# Patient Record
Sex: MALE | Race: WHITE | Employment: UNEMPLOYED | URBAN - NONMETROPOLITAN AREA
[De-identification: names, ages, dates, MRNs, and addresses within clinical notes are randomized per-mention and may not be internally consistent; named-entity substitution may affect disease eponyms.]

---

## 2018-03-14 ENCOUNTER — OFFICE VISIT (OUTPATIENT)
Dept: FAMILY MEDICINE CLINIC | Age: 38
End: 2018-03-14
Payer: COMMERCIAL

## 2018-03-14 VITALS
BODY MASS INDEX: 32.62 KG/M2 | DIASTOLIC BLOOD PRESSURE: 80 MMHG | HEIGHT: 71 IN | HEART RATE: 98 BPM | OXYGEN SATURATION: 98 % | TEMPERATURE: 97.8 F | WEIGHT: 233 LBS | SYSTOLIC BLOOD PRESSURE: 130 MMHG

## 2018-03-14 DIAGNOSIS — Z00.00 ANNUAL PHYSICAL EXAM: Primary | ICD-10-CM

## 2018-03-14 PROCEDURE — 99385 PREV VISIT NEW AGE 18-39: CPT | Performed by: NURSE PRACTITIONER

## 2018-03-14 RX ORDER — HYDROCODONE BITARTRATE AND ACETAMINOPHEN 10; 325 MG/1; MG/1
1 TABLET ORAL EVERY 6 HOURS PRN
COMMUNITY
End: 2019-01-15 | Stop reason: ALTCHOICE

## 2018-03-14 RX ORDER — IBUPROFEN 800 MG/1
800 TABLET ORAL EVERY 6 HOURS PRN
COMMUNITY
End: 2019-01-15 | Stop reason: ALTCHOICE

## 2018-03-14 RX ORDER — HYDROCODONE BITARTRATE AND ACETAMINOPHEN 10; 325 MG/1; MG/1
TABLET ORAL
Refills: 0 | COMMUNITY
Start: 2018-03-07 | End: 2018-03-14 | Stop reason: SDUPTHER

## 2018-03-14 ASSESSMENT — PATIENT HEALTH QUESTIONNAIRE - PHQ9
2. FEELING DOWN, DEPRESSED OR HOPELESS: 0
1. LITTLE INTEREST OR PLEASURE IN DOING THINGS: 0
SUM OF ALL RESPONSES TO PHQ9 QUESTIONS 1 & 2: 0
SUM OF ALL RESPONSES TO PHQ QUESTIONS 1-9: 0

## 2018-06-07 PROBLEM — M54.16 LUMBAR RADICULOPATHY: Status: ACTIVE | Noted: 2018-06-07

## 2018-06-07 PROBLEM — M51.26 HERNIATED LUMBAR INTERVERTEBRAL DISC: Status: ACTIVE | Noted: 2018-06-07

## 2019-01-15 ENCOUNTER — OFFICE VISIT (OUTPATIENT)
Dept: FAMILY MEDICINE CLINIC | Age: 39
End: 2019-01-15
Payer: COMMERCIAL

## 2019-01-15 VITALS
HEIGHT: 70 IN | HEART RATE: 97 BPM | WEIGHT: 215.2 LBS | BODY MASS INDEX: 30.81 KG/M2 | OXYGEN SATURATION: 97 % | TEMPERATURE: 99 F | DIASTOLIC BLOOD PRESSURE: 86 MMHG | SYSTOLIC BLOOD PRESSURE: 138 MMHG

## 2019-01-15 DIAGNOSIS — Z00.00 WELL ADULT EXAM: Primary | ICD-10-CM

## 2019-01-15 DIAGNOSIS — R22.1 NECK MASS: ICD-10-CM

## 2019-01-15 PROCEDURE — 99395 PREV VISIT EST AGE 18-39: CPT | Performed by: NURSE PRACTITIONER

## 2019-01-15 PROCEDURE — G8484 FLU IMMUNIZE NO ADMIN: HCPCS | Performed by: NURSE PRACTITIONER

## 2019-01-15 RX ORDER — CEPHALEXIN 500 MG/1
500 CAPSULE ORAL 2 TIMES DAILY
Qty: 20 CAPSULE | Refills: 0 | Status: SHIPPED | OUTPATIENT
Start: 2019-01-15 | End: 2019-01-25

## 2019-01-15 ASSESSMENT — ENCOUNTER SYMPTOMS
RESPIRATORY NEGATIVE: 1
GASTROINTESTINAL NEGATIVE: 1
EYES NEGATIVE: 1

## 2021-02-14 ENCOUNTER — APPOINTMENT (OUTPATIENT)
Dept: GENERAL RADIOLOGY | Age: 41
End: 2021-02-14
Payer: COMMERCIAL

## 2021-02-14 ENCOUNTER — HOSPITAL ENCOUNTER (EMERGENCY)
Age: 41
Discharge: HOME | End: 2021-02-14
Attending: EMERGENCY MEDICINE
Payer: COMMERCIAL

## 2021-02-14 VITALS
DIASTOLIC BLOOD PRESSURE: 106 MMHG | HEART RATE: 98 BPM | OXYGEN SATURATION: 98 % | SYSTOLIC BLOOD PRESSURE: 153 MMHG | RESPIRATION RATE: 17 BRPM | TEMPERATURE: 98.5 F

## 2021-02-14 DIAGNOSIS — S20.212A CONTUSION OF LEFT CHEST WALL, INITIAL ENCOUNTER: ICD-10-CM

## 2021-02-14 DIAGNOSIS — S22.41XA CLOSED FRACTURE OF MULTIPLE RIBS OF RIGHT SIDE, INITIAL ENCOUNTER: Primary | ICD-10-CM

## 2021-02-14 PROCEDURE — 99281 EMR DPT VST MAYX REQ PHY/QHP: CPT

## 2021-02-14 PROCEDURE — 96372 THER/PROPH/DIAG INJ SC/IM: CPT

## 2021-02-14 PROCEDURE — 6360000002 HC RX W HCPCS: Performed by: EMERGENCY MEDICINE

## 2021-02-14 PROCEDURE — 71101 X-RAY EXAM UNILAT RIBS/CHEST: CPT

## 2021-02-14 RX ORDER — KETOROLAC TROMETHAMINE 10 MG/1
10 TABLET, FILM COATED ORAL EVERY 6 HOURS PRN
Qty: 20 TABLET | Refills: 0 | Status: SHIPPED | OUTPATIENT
Start: 2021-02-14 | End: 2022-10-04

## 2021-02-14 RX ORDER — ORPHENADRINE CITRATE 30 MG/ML
60 INJECTION INTRAMUSCULAR; INTRAVENOUS ONCE
Status: COMPLETED | OUTPATIENT
Start: 2021-02-14 | End: 2021-02-14

## 2021-02-14 RX ORDER — OXYCODONE HYDROCHLORIDE AND ACETAMINOPHEN 5; 325 MG/1; MG/1
1 TABLET ORAL EVERY 6 HOURS PRN
Qty: 12 TABLET | Refills: 0 | Status: SHIPPED | OUTPATIENT
Start: 2021-02-14 | End: 2021-02-17

## 2021-02-14 RX ORDER — KETOROLAC TROMETHAMINE 30 MG/ML
60 INJECTION, SOLUTION INTRAMUSCULAR; INTRAVENOUS ONCE
Status: COMPLETED | OUTPATIENT
Start: 2021-02-14 | End: 2021-02-14

## 2021-02-14 RX ORDER — ORPHENADRINE CITRATE 100 MG/1
100 TABLET, EXTENDED RELEASE ORAL 2 TIMES DAILY
Qty: 20 TABLET | Refills: 0 | Status: SHIPPED | OUTPATIENT
Start: 2021-02-14 | End: 2021-02-24

## 2021-02-14 RX ADMIN — ORPHENADRINE CITRATE 60 MG: 30 INJECTION INTRAMUSCULAR; INTRAVENOUS at 05:44

## 2021-02-14 RX ADMIN — KETOROLAC TROMETHAMINE 60 MG: 30 INJECTION, SOLUTION INTRAMUSCULAR at 05:44

## 2021-02-14 ASSESSMENT — PAIN SCALES - GENERAL
PAINLEVEL_OUTOF10: 9
PAINLEVEL_OUTOF10: 9

## 2021-02-14 ASSESSMENT — ENCOUNTER SYMPTOMS
VOMITING: 0
ABDOMINAL PAIN: 0
SHORTNESS OF BREATH: 0
SORE THROAT: 0
BACK PAIN: 0
SINUS PRESSURE: 0
APNEA: 0
COLOR CHANGE: 0
CONSTIPATION: 0
PHOTOPHOBIA: 0
RHINORRHEA: 0
NAUSEA: 0
COUGH: 0
ABDOMINAL DISTENTION: 0
WHEEZING: 0
DIARRHEA: 0
EYE PAIN: 0

## 2021-02-14 ASSESSMENT — PAIN DESCRIPTION - FREQUENCY: FREQUENCY: INTERMITTENT

## 2021-02-14 ASSESSMENT — PAIN DESCRIPTION - ORIENTATION: ORIENTATION: LEFT;ANTERIOR

## 2021-02-14 ASSESSMENT — PAIN DESCRIPTION - ONSET: ONSET: SUDDEN

## 2021-02-14 ASSESSMENT — PAIN DESCRIPTION - LOCATION: LOCATION: CHEST

## 2021-02-14 ASSESSMENT — PAIN DESCRIPTION - PROGRESSION: CLINICAL_PROGRESSION: GRADUALLY WORSENING

## 2021-02-14 NOTE — ED PROVIDER NOTES
[address] [facility] [address] ED  eMERGENCY dEPARTMENT eNCOUnter      Pt Name: Margie Vital  MRN: [id number]  [place] 1980  Date of evaluation: 2/14/2021  Provider: Paloma Sharpe MD    [address]       Chief Complaint   Patient presents with    Rib Pain     left anterior rib pain after fall into wood on thursday. no bruising noted to site, pain on palpation         HISTORY OF PRESENT ILLNESS   (Location/Symptom, Timing/Onset,Context/Setting, Quality, Duration, Modifying Factors, Severity)  Note limiting factors. Margie Vital is a 45 y.o. male who presents to the emergency department with complaint of left anterior rib cage pain since Thursday. Patient was cutting woods when he lost his balance and fell striking left chest wall on wood. No loss of consciousness. No other injuries. Pain has gotten worse and making it difficult for him to take deep breaths. Pain is 9 in a scale of 1-10 and worse with deep breaths and movement. Pain is sharp and persistent. Pain does not radiate. Denies shortness of breath. Denies any other systemic symptoms. HPI    Nursing Notes were reviewed. REVIEW OF SYSTEMS    (2-9 systems for level 4, 10 or more for level 5)     Review of Systems   Constitutional: Negative. Negative for activity change, appetite change, chills, fatigue and fever. HENT: Negative for congestion, ear discharge, ear pain, hearing loss, rhinorrhea, sinus pressure and sore throat. Eyes: Negative for photophobia, pain and visual disturbance. Respiratory: Negative for apnea, cough, shortness of breath and wheezing. Cardiovascular: Positive for chest pain. Negative for palpitations and leg swelling. Gastrointestinal: Negative for abdominal distention, abdominal pain, constipation, diarrhea, nausea and vomiting. Endocrine: Negative for cold intolerance, heat intolerance and polyuria. Genitourinary: Negative for dysuria, flank pain, frequency and urgency.    Musculoskeletal: Positive for arthralgias and myalgias. Negative for back pain, gait problem and neck stiffness. Skin: Negative for color change, pallor and rash. Allergic/Immunologic: Negative for food allergies and immunocompromised state. Neurological: Negative for dizziness, tremors, syncope, weakness, light-headedness and headaches. Psychiatric/Behavioral: Negative for agitation, confusion and hallucinations. All other systems reviewed and are negative. Except as noted above the remainder of the review of systems was reviewed and negative.        PAST MEDICAL HISTORY     Past Medical History:   Diagnosis Date    Lumbar radiculopathy 6/7/2018         SURGICAL HISTORY       Past Surgical History:   Procedure Laterality Date    CYST REMOVAL  2012    neck/back of head    OTHER SURGICAL HISTORY  1/17/13    pilonidol cyst    OTHER SURGICAL HISTORY  3/4/13    Repair of scalp wound dehiscence    OTHER SURGICAL HISTORY  3/4/13    exploration of scalp wound with ligation of bleeder    WISDOM TOOTH EXTRACTION           CURRENT MEDICATIONS       Previous Medications    No medications on file       ALLERGIES     Vicodin [hydrocodone-acetaminophen] and Codeine    FAMILY HISTORY       Family History   Problem Relation Age of Onset    Diabetes Father     Cancer Neg Hx     Breast Cancer Neg Hx     Colon Cancer Neg Hx     Prostate Cancer Neg Hx     High Cholesterol Neg Hx     High Blood Pressure Neg Hx           SOCIAL HISTORY       Social History     Socioeconomic History    Marital status: Single     Spouse name: None    Number of children: None    Years of education: None    Highest education level: None   Occupational History    None   Social Needs    Financial resource strain: None    Food insecurity     Worry: None     Inability: None    Transportation needs     Medical: None     Non-medical: None   Tobacco Use    Smoking status: Current Every Day Smoker     Packs/day: 1.00     Years: 18.00     Pack years: 18.00  Smokeless tobacco: Never Used   Substance and Sexual Activity    Alcohol use: None    Drug use: None    Sexual activity: None   Lifestyle    Physical activity     Days per week: None     Minutes per session: None    Stress: None   Relationships    Social connections     Talks on phone: None     Gets together: None     Attends Orthodox service: None     Active member of club or organization: None     Attends meetings of clubs or organizations: None     Relationship status: None    Intimate partner violence     Fear of current or ex partner: None     Emotionally abused: None     Physically abused: None     Forced sexual activity: None   Other Topics Concern    None   Social History Narrative    None       SCREENINGS             PHYSICAL EXAM    (up to 7 for level 4, 8 or more for level 5)     ED Triage Vitals [02/14/21 0517]   BP Temp Temp Source Pulse Resp SpO2 Height Weight   (!) 153/106 98.5 °F (36.9 °C) Oral 98 17 98 % -- --       Physical Exam  Vitals signs and nursing note reviewed. Constitutional:       General: He is in acute distress. Appearance: Normal appearance. He is well-developed. He is obese. He is not ill-appearing, toxic-appearing or diaphoretic. HENT:      Head: Normocephalic and atraumatic. Nose: Nose normal. No congestion or rhinorrhea. Mouth/Throat:      Mouth: Mucous membranes are moist.      Pharynx: Oropharynx is clear. No oropharyngeal exudate or posterior oropharyngeal erythema. Eyes:      General: No scleral icterus. Right eye: No discharge. Left eye: No discharge. Extraocular Movements: Extraocular movements intact. Conjunctiva/sclera: Conjunctivae normal.      Pupils: Pupils are equal, round, and reactive to light. Neck:      Musculoskeletal: Normal range of motion and neck supple. No neck rigidity or muscular tenderness. Thyroid: No thyromegaly. Vascular: No carotid bruit or JVD. Trachea: No tracheal deviation. Cardiovascular:      Rate and Rhythm: Normal rate and regular rhythm. Heart sounds: Normal heart sounds. No murmur. No friction rub. No gallop. Pulmonary:      Effort: Pulmonary effort is normal. No respiratory distress. Breath sounds: Normal breath sounds. No stridor. No wheezing, rhonchi or rales. Chest:      Chest wall: No tenderness. Abdominal:      General: Bowel sounds are normal. There is no distension. Palpations: Abdomen is soft. There is no mass. Tenderness: There is no abdominal tenderness. There is no right CVA tenderness, left CVA tenderness, guarding or rebound. Hernia: No hernia is present. Musculoskeletal: Normal range of motion. General: Signs of injury present. No swelling, tenderness or deformity. Right lower leg: No edema. Left lower leg: No edema. Comments: Tenderness on left anterior upper rib cage. No ecchymosis noted. Lymphadenopathy:      Cervical: No cervical adenopathy. Skin:     General: Skin is warm and dry. Capillary Refill: Capillary refill takes less than 2 seconds. Coloration: Skin is not jaundiced or pale. Findings: No bruising, erythema, lesion or rash. Neurological:      General: No focal deficit present. Mental Status: He is alert and oriented to person, place, and time. Mental status is at baseline. Cranial Nerves: No cranial nerve deficit. Sensory: No sensory deficit. Motor: No weakness or abnormal muscle tone. Coordination: Coordination normal.      Gait: Gait normal.      Deep Tendon Reflexes: Reflexes are normal and symmetric. Reflexes normal.   Psychiatric:         Mood and Affect: Mood normal.         Behavior: Behavior normal.         Thought Content:  Thought content normal.         Judgment: Judgment normal.         DIAGNOSTIC RESULTS     EKG: All EKG's are interpreted by the Emergency Department Physician who either signs or Co-signs this chart in the absence of a cardiologist.        RADIOLOGY:   Non-plain film images such as CT, Ultrasound and MRI are read by the radiologist. Fleeta Ax radiographicimages are visualized and preliminarily interpreted by the emergency physician with the below findings:    X-ray rib series shows minimally displaced fracture of left 3rd rib, nondisplaced fracture of left fourth rib, no pneumothorax, no hemothorax. Interpretation per the Radiologist below, if available at the time of this note:    XR RIBS LEFT INCLUDE CHEST (MIN 3 VIEWS)    (Results Pending)         ED BEDSIDE ULTRASOUND:   Performed by ED Physician - none    LABS:  Labs Reviewed - No data to display    All other labs were within normal range or not returned as of this dictation. EMERGENCY DEPARTMENT COURSE and DIFFERENTIALDIAGNOSIS/MDM:    X-ray shows minimally displaced left third rib and nondisplaced left fourth rib with no associated pneumothorax or hemothorax. Adequate pain control was achieved with Toradol and Norflex IM. Patient was given incentive spirometer to be used at home as needed. He was counseled on deep breaths. He will be prescribed Toradol, Norflex, Percocet. He was advised to follow-up with family doctor within 3 days and to come back to the ED for new or worsening symptoms. He was agreeable with plan of care. All of his questions were addressed to his satisfaction.    Vitals:    Vitals:    02/14/21 0517   BP: (!) 153/106   Pulse: 98   Resp: 17   Temp: 98.5 °F (36.9 °C)   TempSrc: Oral   SpO2: 98%           MDM  Number of Diagnoses or Management Options     Amount and/or Complexity of Data Reviewed  Tests in the radiology section of CPT®: reviewed and ordered    Risk of Complications, Morbidity, and/or Mortality  Presenting problems: moderate  Diagnostic procedures: moderate  Management options: moderate    Patient Progress  Patient progress: improved      CRITICAL CARE TIME   Total Critical Care time was  minutes, excluding separately reportable procedures. There was a high probability of clinically significant/life threatening deterioration in the patient's condition which required my urgentintervention. CONSULTS:  None    PROCEDURES:  Unless otherwise noted below, none     Procedures    FINAL IMPRESSION      1. Closed fracture of multiple ribs of right side, initial encounter    2. Contusion of left chest wall, initial encounter          DISPOSITION/PLAN   DISPOSITION Decision To Discharge 02/14/2021 05:54:55 AM      PATIENT REFERRED TO:  СЕРГЕЙ Pugh - CNP  [address], [address]  [place] [phone]    In 3 days        DISCHARGE MEDICATIONS:  New Prescriptions    KETOROLAC (TORADOL) 10 MG TABLET    Take 1 tablet by mouth every 6 hours as needed for Pain    ORPHENADRINE (NORFLEX) 100 MG EXTENDED RELEASE TABLET    Take 1 tablet by mouth 2 times daily for 10 days    OXYCODONE-ACETAMINOPHEN (PERCOCET) 5-325 MG PER TABLET    Take 1 tablet by mouth every 6 hours as needed for Pain for up to 3 days. WARNING:  May cause drowsiness. May impair ability to operate vehicles or machinery. Do not use in combination with alcohol. Periodic Controlled Substance Monitoring: No signs of potential drug abuse or diversion identified.  Anastasia Jj MD)    (Please note that portions of this note were completed with a voice recognitionprogram.  Efforts were made to edit the dictations but occasionally words are mis-transcribed.)    Anastasia Jj MD (electronically signed)  Attending Emergency Physician          Anastasia Jj MD  02/14/21 3307

## 2022-01-22 ENCOUNTER — HOSPITAL ENCOUNTER (EMERGENCY)
Age: 42
Discharge: HOME | End: 2022-01-22
Attending: EMERGENCY MEDICINE
Payer: COMMERCIAL

## 2022-01-22 VITALS
HEART RATE: 108 BPM | TEMPERATURE: 97.4 F | SYSTOLIC BLOOD PRESSURE: 149 MMHG | BODY MASS INDEX: 31.5 KG/M2 | OXYGEN SATURATION: 97 % | RESPIRATION RATE: 16 BRPM | WEIGHT: 220 LBS | HEIGHT: 70 IN | DIASTOLIC BLOOD PRESSURE: 100 MMHG

## 2022-01-22 DIAGNOSIS — K02.9 PAIN DUE TO DENTAL CARIES: Primary | ICD-10-CM

## 2022-01-22 DIAGNOSIS — K04.7 DENTAL ABSCESS: ICD-10-CM

## 2022-01-22 PROCEDURE — 6370000000 HC RX 637 (ALT 250 FOR IP): Performed by: EMERGENCY MEDICINE

## 2022-01-22 PROCEDURE — 99283 EMERGENCY DEPT VISIT LOW MDM: CPT

## 2022-01-22 RX ORDER — ONDANSETRON 4 MG/1
4 TABLET, ORALLY DISINTEGRATING ORAL ONCE
Status: COMPLETED | OUTPATIENT
Start: 2022-01-22 | End: 2022-01-22

## 2022-01-22 RX ORDER — ONDANSETRON 4 MG/1
4 TABLET, ORALLY DISINTEGRATING ORAL 3 TIMES DAILY PRN
Qty: 21 TABLET | Refills: 0 | Status: SHIPPED | OUTPATIENT
Start: 2022-01-22 | End: 2022-10-04

## 2022-01-22 RX ORDER — BUPROPION HYDROCHLORIDE 75 MG/1
75 TABLET ORAL DAILY
Qty: 30 TABLET | Refills: 0 | Status: SHIPPED | OUTPATIENT
Start: 2022-01-22 | End: 2022-10-04

## 2022-01-22 RX ORDER — AMOXICILLIN AND CLAVULANATE POTASSIUM 875; 125 MG/1; MG/1
1 TABLET, FILM COATED ORAL ONCE
Status: COMPLETED | OUTPATIENT
Start: 2022-01-22 | End: 2022-01-22

## 2022-01-22 RX ORDER — AMOXICILLIN AND CLAVULANATE POTASSIUM 875; 125 MG/1; MG/1
1 TABLET, FILM COATED ORAL 2 TIMES DAILY
Qty: 20 TABLET | Refills: 0 | Status: SHIPPED | OUTPATIENT
Start: 2022-01-22 | End: 2022-02-01

## 2022-01-22 RX ORDER — HYDROCODONE BITARTRATE AND ACETAMINOPHEN 5; 325 MG/1; MG/1
1 TABLET ORAL EVERY 6 HOURS PRN
Qty: 10 TABLET | Refills: 0 | Status: SHIPPED | OUTPATIENT
Start: 2022-01-22 | End: 2022-01-25

## 2022-01-22 RX ORDER — HYDROCODONE BITARTRATE AND ACETAMINOPHEN 5; 325 MG/1; MG/1
1 TABLET ORAL ONCE
Status: COMPLETED | OUTPATIENT
Start: 2022-01-22 | End: 2022-01-22

## 2022-01-22 RX ORDER — BROMPHENIRAMINE MALEATE, PSEUDOEPHEDRINE HYDROCHLORIDE, AND DEXTROMETHORPHAN HYDROBROMIDE 2; 30; 10 MG/5ML; MG/5ML; MG/5ML
5 SYRUP ORAL PRN
COMMUNITY
Start: 2022-01-18 | End: 2022-10-04

## 2022-01-22 RX ADMIN — HYDROCODONE BITARTRATE AND ACETAMINOPHEN 1 TABLET: 5; 325 TABLET ORAL at 11:56

## 2022-01-22 RX ADMIN — AMOXICILLIN AND CLAVULANATE POTASSIUM 1 TABLET: 875; 125 TABLET, FILM COATED ORAL at 11:56

## 2022-01-22 RX ADMIN — ONDANSETRON 4 MG: 4 TABLET, ORALLY DISINTEGRATING ORAL at 11:56

## 2022-01-22 ASSESSMENT — ENCOUNTER SYMPTOMS
NAUSEA: 0
BACK PAIN: 0
VOMITING: 0
ABDOMINAL PAIN: 0
SORE THROAT: 0
SHORTNESS OF BREATH: 0
COUGH: 0
DIARRHEA: 0

## 2022-01-22 ASSESSMENT — PAIN DESCRIPTION - PAIN TYPE: TYPE: ACUTE PAIN

## 2022-01-22 ASSESSMENT — PAIN DESCRIPTION - FREQUENCY: FREQUENCY: CONTINUOUS

## 2022-01-22 ASSESSMENT — PAIN DESCRIPTION - ONSET: ONSET: SUDDEN

## 2022-01-22 ASSESSMENT — PAIN SCALES - GENERAL
PAINLEVEL_OUTOF10: 10
PAINLEVEL_OUTOF10: 10

## 2022-01-22 ASSESSMENT — PAIN DESCRIPTION - ORIENTATION: ORIENTATION: RIGHT;UPPER

## 2022-01-22 ASSESSMENT — PAIN DESCRIPTION - LOCATION: LOCATION: TEETH

## 2022-01-22 ASSESSMENT — PAIN DESCRIPTION - PROGRESSION: CLINICAL_PROGRESSION: GRADUALLY WORSENING

## 2022-01-22 ASSESSMENT — PAIN DESCRIPTION - DESCRIPTORS: DESCRIPTORS: SHARP

## 2022-01-22 NOTE — ED PROVIDER NOTES
[address] [facility] ED  eMERGENCYdEPARTMENT eNCOUnter      Pt Name: Brian Arguelles  MRN: [id number]  [place] 1980  Date of evaluation: 1/22/2022  Kamilla Reed MD    CHIEF COMPLAINT           HPI  Brian Arguelles is a 37 y.o. male per chart review has a h/o lumbar radiculopathy. Patient presents to ER today with complaints of dental pain and facial swelling to right cheek. Patient states he has had dental pain for \"months\" but the swelling started last night. He rates his pain 10/10 throbbing in nature. He last took Aleve at HS. He hasn't taken anything for the pain today. ROS  Review of Systems   Constitutional: Negative for activity change, chills and fever. HENT: Positive for dental problem. Negative for ear pain and sore throat. Eyes: Negative for visual disturbance. Respiratory: Negative for cough and shortness of breath. Cardiovascular: Negative for chest pain, palpitations and leg swelling. Gastrointestinal: Negative for abdominal pain, diarrhea, nausea and vomiting. Genitourinary: Negative for dysuria. Musculoskeletal: Negative for back pain. Skin: Negative for rash. Neurological: Negative for dizziness and weakness. Except as noted above the remainder of the review of systems was reviewed and negative.        PAST MEDICAL HISTORY     Past Medical History:   Diagnosis Date    Lumbar radiculopathy 6/7/2018         SURGICAL HISTORY       Past Surgical History:   Procedure Laterality Date    CYST REMOVAL  2012    neck/back of head    OTHER SURGICAL HISTORY  1/17/13    pilonidol cyst    OTHER SURGICAL HISTORY  3/4/13    Repair of scalp wound dehiscence    OTHER SURGICAL HISTORY  3/4/13    exploration of scalp wound with ligation of bleeder    WISDOM TOOTH EXTRACTION           CURRENTMEDICATIONS       Previous Medications    BROMPHENIRAMINE-PSEUDOEPHEDRINE-DM 2-30-10 MG/5ML SYRUP    Take 5 mLs by mouth as needed    KETOROLAC (TORADOL) 10 MG TABLET    Take 1 tablet by mouth every 6 hours as needed for Pain       ALLERGIES     Vicodin [hydrocodone-acetaminophen] and Codeine    FAMILY HISTORY       Family History   Problem Relation Age of Onset    Diabetes Father     Cancer Neg Hx     Breast Cancer Neg Hx     Colon Cancer Neg Hx     Prostate Cancer Neg Hx     High Cholesterol Neg Hx     High Blood Pressure Neg Hx           SOCIAL HISTORY       Social History     Socioeconomic History    Marital status: Single     Spouse name: None    Number of children: None    Years of education: None    Highest education level: None   Occupational History    None   Tobacco Use    Smoking status: Current Every Day Smoker     Packs/day: 1.00     Years: 18.00     Pack years: 18.00    Smokeless tobacco: Never Used   Substance and Sexual Activity    Alcohol use: Not Currently    Drug use: Not Currently    Sexual activity: None   Other Topics Concern    None   Social History Narrative    None     Social Determinants of Health     Financial Resource Strain:     Difficulty of Paying Living Expenses: Not on file   Food Insecurity:     Worried About Running Out of Food in the Last Year: Not on file    Zev of Food in the Last Year: Not on file   Transportation Needs:     Lack of Transportation (Medical): Not on file    Lack of Transportation (Non-Medical):  Not on file   Physical Activity:     Days of Exercise per Week: Not on file    Minutes of Exercise per Session: Not on file   Stress:     Feeling of Stress : Not on file   Social Connections:     Frequency of Communication with Friends and Family: Not on file    Frequency of Social Gatherings with Friends and Family: Not on file    Attends Catholic Services: Not on file    Active Member of Clubs or Organizations: Not on file    Attends Club or Organization Meetings: Not on file    Marital Status: Not on file   Intimate Partner Violence:     Fear of Current or Ex-Partner: Not on file    Emotionally Abused: Not on file    Physically Abused: Not on file    Sexually Abused: Not on file   Housing Stability:     Unable to Pay for Housing in the Last Year: Not on file    Number of Places Lived in the Last Year: Not on file    Unstable Housing in the Last Year: Not on file         PHYSICAL EXAM       ED Triage Vitals [01/22/22 1137]   BP Temp Temp Source Pulse Resp SpO2 Height Weight   (!) 149/100 97.4 °F (36.3 °C) Temporal 108 16 97 % 5' 10\" (1.778 m) 220 lb (99.8 kg)       Physical Exam  Vitals and nursing note reviewed. Constitutional:       Appearance: He is well-developed. HENT:      Head: Normocephalic. Jaw: No trismus. Right Ear: External ear normal.      Left Ear: External ear normal.      Mouth/Throat:      Dentition: Dental tenderness and dental caries present. Eyes:      Conjunctiva/sclera: Conjunctivae normal.      Pupils: Pupils are equal, round, and reactive to light. Cardiovascular:      Rate and Rhythm: Normal rate and regular rhythm. Heart sounds: Normal heart sounds. Pulmonary:      Effort: Pulmonary effort is normal.      Breath sounds: Normal breath sounds. Abdominal:      General: Bowel sounds are normal. There is no distension. Palpations: Abdomen is soft. Tenderness: There is no abdominal tenderness. Musculoskeletal:         General: Normal range of motion. Cervical back: Normal range of motion and neck supple. Skin:     General: Skin is warm and dry. Neurological:      Mental Status: He is alert and oriented to person, place, and time. MDM  37year old male presents with dental pain. Pt noted to be afebrile and hemodynamically stable. Patient medicated with Augmentin PO, Norco PO, Zofran ODT. Patient states he tolerates Norco but has nausea at times. Pt without fever, drooling, trismus, stridor. Pt reassessed and doing well.   Discussed importance of patient following up with dentist and red flags when to come back to ER with fever, drooling, trismus, or stridor. Pt has a dentist and is going to make an appt next week. .Pt smokes. Pt counseled on smoking cessation x 11 minutes and advised about how smoking is making his condition worse. Pt and I discussed treatment options and pt wanted to try wellbutrin to help with smoking cessation. Pt able to tolerate PO in the ED. Pt given dental pain warning signs, prescription for augmentin, zofran, wellbutrin, norco.  Pt will f/u with dentistry next week. FINAL IMPRESSION      1. Pain due to dental caries    2.  Dental abscess          DISPOSITION/PLAN   DISPOSITION Decision To Discharge 01/22/2022 12:01:34 PM        DISCHARGE MEDICATIONS:  [unfilled]         Divina Croft MD(electronically signed)  Attending Emergency Physician          Divina Croft MD  01/22/22 7523

## 2022-09-29 ENCOUNTER — TELEPHONE (OUTPATIENT)
Dept: FAMILY MEDICINE CLINIC | Age: 42
End: 2022-09-29

## 2022-10-04 ENCOUNTER — OFFICE VISIT (OUTPATIENT)
Dept: FAMILY MEDICINE CLINIC | Age: 42
End: 2022-10-04
Payer: COMMERCIAL

## 2022-10-04 VITALS
OXYGEN SATURATION: 96 % | WEIGHT: 210 LBS | SYSTOLIC BLOOD PRESSURE: 132 MMHG | DIASTOLIC BLOOD PRESSURE: 80 MMHG | HEIGHT: 70 IN | BODY MASS INDEX: 30.06 KG/M2 | HEART RATE: 105 BPM

## 2022-10-04 DIAGNOSIS — R53.83 OTHER FATIGUE: ICD-10-CM

## 2022-10-04 DIAGNOSIS — R06.00 DYSPNEA, UNSPECIFIED TYPE: Primary | ICD-10-CM

## 2022-10-04 DIAGNOSIS — Z13.220 LIPID SCREENING: ICD-10-CM

## 2022-10-04 DIAGNOSIS — F17.200 SMOKER: ICD-10-CM

## 2022-10-04 PROCEDURE — 99204 OFFICE O/P NEW MOD 45 MIN: CPT | Performed by: NURSE PRACTITIONER

## 2022-10-04 RX ORDER — VARENICLINE TARTRATE 1 MG/1
1 TABLET, FILM COATED ORAL 2 TIMES DAILY
Qty: 60 TABLET | Refills: 2 | Status: SHIPPED | OUTPATIENT
Start: 2022-10-04

## 2022-10-04 RX ORDER — VARENICLINE TARTRATE 25 MG
KIT ORAL
Qty: 53 TABLET | Refills: 0 | Status: SHIPPED | OUTPATIENT
Start: 2022-10-04

## 2022-10-04 SDOH — ECONOMIC STABILITY: FOOD INSECURITY: WITHIN THE PAST 12 MONTHS, YOU WORRIED THAT YOUR FOOD WOULD RUN OUT BEFORE YOU GOT MONEY TO BUY MORE.: NEVER TRUE

## 2022-10-04 SDOH — ECONOMIC STABILITY: FOOD INSECURITY: WITHIN THE PAST 12 MONTHS, THE FOOD YOU BOUGHT JUST DIDN'T LAST AND YOU DIDN'T HAVE MONEY TO GET MORE.: NEVER TRUE

## 2022-10-04 ASSESSMENT — SOCIAL DETERMINANTS OF HEALTH (SDOH): HOW HARD IS IT FOR YOU TO PAY FOR THE VERY BASICS LIKE FOOD, HOUSING, MEDICAL CARE, AND HEATING?: NOT HARD AT ALL

## 2022-10-04 ASSESSMENT — PATIENT HEALTH QUESTIONNAIRE - PHQ9
SUM OF ALL RESPONSES TO PHQ QUESTIONS 1-9: 0
SUM OF ALL RESPONSES TO PHQ QUESTIONS 1-9: 0
1. LITTLE INTEREST OR PLEASURE IN DOING THINGS: 0
SUM OF ALL RESPONSES TO PHQ QUESTIONS 1-9: 0
SUM OF ALL RESPONSES TO PHQ QUESTIONS 1-9: 0
SUM OF ALL RESPONSES TO PHQ9 QUESTIONS 1 & 2: 0
2. FEELING DOWN, DEPRESSED OR HOPELESS: 0

## 2022-10-04 ASSESSMENT — ENCOUNTER SYMPTOMS: RESPIRATORY NEGATIVE: 1

## 2022-10-04 NOTE — PROGRESS NOTES
Subjective  Chief Complaint   Patient presents with    Establish Care     Pt states concern with breathing. HPI  Presents today to re-establish care. Having intermittent SOB when waking up in the morning stating he \"wakes up gasping\". Episodes only happen about 2 times a months then subside on their own. No SOB with other activities or in between episodes. Smokes 2.5 ppd of cigarettes, but is trying to cut back and interested in smoking. No GERD like symptoms. He reports that his wife has commented on him snoring. 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     Patient Active Problem List    Diagnosis Date Noted    Lumbar radiculopathy 06/07/2018    Herniated lumbar intervertebral disc 06/07/2018    Dehiscence of operative wound 02/18/2013    Scalp cyst 01/25/2013    Pilonidal cyst with abscess 01/17/2013     Past Medical History:   Diagnosis Date    Lumbar radiculopathy 6/7/2018     Past Surgical History:   Procedure Laterality Date    CYST REMOVAL  2012    neck/back of head    OTHER SURGICAL HISTORY  1/17/13    pilonidol cyst    OTHER SURGICAL HISTORY  3/4/13    Repair of scalp wound dehiscence    OTHER SURGICAL HISTORY  3/4/13    exploration of scalp wound with ligation of bleeder    WISDOM TOOTH EXTRACTION       Family History   Problem Relation Age of Onset    Diabetes Father     Cancer Neg Hx     Breast Cancer Neg Hx     Colon Cancer Neg Hx     Prostate Cancer Neg Hx     High Cholesterol Neg Hx     High Blood Pressure Neg Hx      Social History     Socioeconomic History    Marital status: Single     Spouse name: None    Number of children: None    Years of education: None    Highest education level: None   Tobacco Use    Smoking status: Every Day     Packs/day: 1.00     Years: 18.00     Pack years: 18.00     Types: Cigarettes    Smokeless tobacco: Never   Substance and Sexual Activity    Alcohol use: Not Currently    Drug use: Not Currently Social Determinants of Health     Financial Resource Strain: Low Risk     Difficulty of Paying Living Expenses: Not hard at all   Food Insecurity: No Food Insecurity    Worried About [employer] in the Last Year: Never true    Ran Out of Food in the Last Year: Never true     No current outpatient medications on file prior to visit. No current facility-administered medications on file prior to visit. Allergies   Allergen Reactions    Vicodin [Hydrocodone-Acetaminophen] Nausea Only and Other (See Comments)     HEADACHES    Codeine Nausea And Vomiting       Review of Systems   Constitutional:  Positive for fatigue. HENT: Negative. Respiratory: Negative. Neurological: Negative. All other systems reviewed and are negative. Objective  Vitals:    10/04/22 1625   BP: 132/80   Pulse: (!) 105   SpO2: 96%   Weight: 210 lb (95.3 kg)   Height: 5' 10\" (1.778 m)     Physical Exam  Vitals reviewed. Constitutional:       Appearance: Normal appearance. HENT:      Head: Normocephalic and atraumatic. Cardiovascular:      Rate and Rhythm: Regular rhythm. Tachycardia present. Pulses: Normal pulses. Heart sounds: Normal heart sounds. Pulmonary:      Effort: Pulmonary effort is normal.      Breath sounds: Normal breath sounds. Musculoskeletal:      Cervical back: Normal range of motion and neck supple. Skin:     General: Skin is warm and dry. Neurological:      General: No focal deficit present. Mental Status: He is alert and oriented to person, place, and time. Mental status is at baseline. Psychiatric:         Mood and Affect: Mood normal.         Behavior: Behavior normal.         Thought Content: Thought content normal.       Assessment & Plan     Diagnosis Orders   1. Dyspnea, unspecified type  Discussed smoking cessation. Discussed possible sleep study. Lungs clear today. If persists consider chest xray      2. Lipid screening  Lipid Panel      3.  Other fatigue updated the computerized patient record. As always, patient is advised that if symptoms worsen in any way they will proceed to the nearest emergency room.        СЕРГЕЙ Atkinson - CNP

## 2022-12-31 ENCOUNTER — HOSPITAL ENCOUNTER (INPATIENT)
Age: 42
LOS: 4 days | Discharge: HOME | DRG: 287 | End: 2023-01-04
Attending: INTERNAL MEDICINE | Admitting: INTERNAL MEDICINE
Payer: COMMERCIAL

## 2022-12-31 ENCOUNTER — APPOINTMENT (OUTPATIENT)
Dept: CT IMAGING | Age: 42
End: 2022-12-31
Payer: COMMERCIAL

## 2022-12-31 ENCOUNTER — HOSPITAL ENCOUNTER (EMERGENCY)
Age: 42
Discharge: ANOTHER HOSPITAL | End: 2022-12-31
Attending: EMERGENCY MEDICINE
Payer: COMMERCIAL

## 2022-12-31 ENCOUNTER — APPOINTMENT (OUTPATIENT)
Dept: GENERAL RADIOLOGY | Age: 42
End: 2022-12-31
Payer: COMMERCIAL

## 2022-12-31 VITALS
OXYGEN SATURATION: 98 % | SYSTOLIC BLOOD PRESSURE: 126 MMHG | WEIGHT: 205 LBS | HEART RATE: 105 BPM | DIASTOLIC BLOOD PRESSURE: 99 MMHG | TEMPERATURE: 97.6 F | BODY MASS INDEX: 29.35 KG/M2 | RESPIRATION RATE: 20 BRPM | HEIGHT: 70 IN

## 2022-12-31 DIAGNOSIS — J81.0 ACUTE PULMONARY EDEMA (HCC): ICD-10-CM

## 2022-12-31 DIAGNOSIS — R06.03 RESPIRATORY DISTRESS: ICD-10-CM

## 2022-12-31 DIAGNOSIS — R59.0 MEDIASTINAL ADENOPATHY: ICD-10-CM

## 2022-12-31 DIAGNOSIS — J44.1 COPD EXACERBATION (HCC): Primary | ICD-10-CM

## 2022-12-31 PROBLEM — I50.9 HEART FAILURE (HCC): Status: ACTIVE | Noted: 2022-12-31

## 2022-12-31 PROBLEM — J96.90 RESPIRATORY FAILURE (HCC): Status: ACTIVE | Noted: 2022-12-31

## 2022-12-31 LAB
ANION GAP SERPL CALCULATED.3IONS-SCNC: 10 MEQ/L (ref 9–15)
BASOPHILS ABSOLUTE: 0 K/UL (ref 0–0.1)
BASOPHILS RELATIVE PERCENT: 0.3 % (ref 0.2–1.2)
BUN BLDV-MCNC: 16 MG/DL (ref 6–20)
CALCIUM SERPL-MCNC: 9 MG/DL (ref 8.5–9.9)
CHLORIDE BLD-SCNC: 108 MEQ/L (ref 95–107)
CO2: 21 MEQ/L (ref 20–31)
CREAT SERPL-MCNC: 0.97 MG/DL (ref 0.7–1.2)
D DIMER: 0.8 MG/L FEU (ref 0–0.5)
EKG ATRIAL RATE: 119 BPM
EKG P AXIS: 14 DEGREES
EKG P-R INTERVAL: 170 MS
EKG Q-T INTERVAL: 320 MS
EKG QRS DURATION: 92 MS
EKG QTC CALCULATION (BAZETT): 450 MS
EKG R AXIS: -7 DEGREES
EKG T AXIS: 93 DEGREES
EKG VENTRICULAR RATE: 119 BPM
EOSINOPHILS ABSOLUTE: 0.1 K/UL (ref 0–0.5)
EOSINOPHILS RELATIVE PERCENT: 0.9 % (ref 0.8–7)
GFR SERPL CREATININE-BSD FRML MDRD: >60 ML/MIN/{1.73_M2}
GLUCOSE BLD-MCNC: 142 MG/DL (ref 70–99)
HCT VFR BLD CALC: 44.4 % (ref 42–52)
HEMOGLOBIN: 14.8 G/DL (ref 13.7–17.5)
IMMATURE GRANULOCYTES #: 0 K/UL
IMMATURE GRANULOCYTES %: 0.3 %
INFLUENZA A BY PCR: NEGATIVE
INFLUENZA B BY PCR: NEGATIVE
LYMPHOCYTES ABSOLUTE: 1.8 K/UL (ref 1.3–3.6)
LYMPHOCYTES RELATIVE PERCENT: 24 %
MCH RBC QN AUTO: 32 PG (ref 25.7–32.2)
MCHC RBC AUTO-ENTMCNC: 33.3 % (ref 32.3–36.5)
MCV RBC AUTO: 95.9 FL (ref 79–92.2)
MONOCYTES ABSOLUTE: 0.4 K/UL (ref 0.3–0.8)
MONOCYTES RELATIVE PERCENT: 5.1 % (ref 5.3–12.2)
NEUTROPHILS ABSOLUTE: 5.3 K/UL (ref 1.8–5.4)
NEUTROPHILS RELATIVE PERCENT: 69.4 % (ref 34–67.9)
PDW BLD-RTO: 13.7 % (ref 11.6–14.4)
PLATELET # BLD: 258 K/UL (ref 163–337)
POTASSIUM SERPL-SCNC: 4.2 MEQ/L (ref 3.4–4.9)
PRO-BNP: 3471 PG/ML
PROCALCITONIN: 0.03 NG/ML (ref 0–0.15)
RBC # BLD: 4.63 M/UL (ref 4.63–6.08)
SARS-COV-2, NAAT: NOT DETECTED
SODIUM BLD-SCNC: 139 MEQ/L (ref 135–144)
TROPONIN: <0.01 NG/ML (ref 0–0.01)
TSH REFLEX: 0.63 UIU/ML (ref 0.44–3.86)
WBC # BLD: 7.7 K/UL (ref 4.2–9)

## 2022-12-31 PROCEDURE — 96375 TX/PRO/DX INJ NEW DRUG ADDON: CPT

## 2022-12-31 PROCEDURE — 87635 SARS-COV-2 COVID-19 AMP PRB: CPT

## 2022-12-31 PROCEDURE — 71046 X-RAY EXAM CHEST 2 VIEWS: CPT

## 2022-12-31 PROCEDURE — 6370000000 HC RX 637 (ALT 250 FOR IP): Performed by: EMERGENCY MEDICINE

## 2022-12-31 PROCEDURE — 85379 FIBRIN DEGRADATION QUANT: CPT

## 2022-12-31 PROCEDURE — 84443 ASSAY THYROID STIM HORMONE: CPT

## 2022-12-31 PROCEDURE — 83880 ASSAY OF NATRIURETIC PEPTIDE: CPT

## 2022-12-31 PROCEDURE — 2500000003 HC RX 250 WO HCPCS: Performed by: INTERNAL MEDICINE

## 2022-12-31 PROCEDURE — 2580000003 HC RX 258: Performed by: INTERNAL MEDICINE

## 2022-12-31 PROCEDURE — 96374 THER/PROPH/DIAG INJ IV PUSH: CPT

## 2022-12-31 PROCEDURE — 85025 COMPLETE CBC W/AUTO DIFF WBC: CPT

## 2022-12-31 PROCEDURE — 94640 AIRWAY INHALATION TREATMENT: CPT

## 2022-12-31 PROCEDURE — 6360000004 HC RX CONTRAST MEDICATION: Performed by: EMERGENCY MEDICINE

## 2022-12-31 PROCEDURE — 93005 ELECTROCARDIOGRAM TRACING: CPT

## 2022-12-31 PROCEDURE — 87502 INFLUENZA DNA AMP PROBE: CPT

## 2022-12-31 PROCEDURE — 36415 COLL VENOUS BLD VENIPUNCTURE: CPT

## 2022-12-31 PROCEDURE — 99285 EMERGENCY DEPT VISIT HI MDM: CPT

## 2022-12-31 PROCEDURE — 84484 ASSAY OF TROPONIN QUANT: CPT

## 2022-12-31 PROCEDURE — 71275 CT ANGIOGRAPHY CHEST: CPT

## 2022-12-31 PROCEDURE — 80048 BASIC METABOLIC PNL TOTAL CA: CPT

## 2022-12-31 PROCEDURE — 84145 PROCALCITONIN (PCT): CPT

## 2022-12-31 PROCEDURE — 6360000002 HC RX W HCPCS: Performed by: EMERGENCY MEDICINE

## 2022-12-31 PROCEDURE — 1210000000 HC MED SURG R&B

## 2022-12-31 PROCEDURE — 6370000000 HC RX 637 (ALT 250 FOR IP): Performed by: INTERNAL MEDICINE

## 2022-12-31 PROCEDURE — 6360000002 HC RX W HCPCS: Performed by: INTERNAL MEDICINE

## 2022-12-31 RX ORDER — POLYETHYLENE GLYCOL 3350 17 G/17G
17 POWDER, FOR SOLUTION ORAL DAILY PRN
Status: DISCONTINUED | OUTPATIENT
Start: 2022-12-31 | End: 2023-01-04 | Stop reason: HOSPADM

## 2022-12-31 RX ORDER — SODIUM CHLORIDE 0.9 % (FLUSH) 0.9 %
5-40 SYRINGE (ML) INJECTION PRN
Status: DISCONTINUED | OUTPATIENT
Start: 2022-12-31 | End: 2023-01-04

## 2022-12-31 RX ORDER — FUROSEMIDE 10 MG/ML
80 INJECTION INTRAMUSCULAR; INTRAVENOUS ONCE
Status: COMPLETED | OUTPATIENT
Start: 2022-12-31 | End: 2022-12-31

## 2022-12-31 RX ORDER — ACETAMINOPHEN 650 MG/1
650 SUPPOSITORY RECTAL EVERY 6 HOURS PRN
Status: DISCONTINUED | OUTPATIENT
Start: 2022-12-31 | End: 2023-01-03 | Stop reason: ALTCHOICE

## 2022-12-31 RX ORDER — ONDANSETRON 4 MG/1
4 TABLET, ORALLY DISINTEGRATING ORAL EVERY 8 HOURS PRN
Status: DISCONTINUED | OUTPATIENT
Start: 2022-12-31 | End: 2022-12-31 | Stop reason: HOSPADM

## 2022-12-31 RX ORDER — ONDANSETRON 2 MG/ML
4 INJECTION INTRAMUSCULAR; INTRAVENOUS EVERY 6 HOURS PRN
Status: DISCONTINUED | OUTPATIENT
Start: 2022-12-31 | End: 2023-01-04 | Stop reason: HOSPADM

## 2022-12-31 RX ORDER — ACETAMINOPHEN 325 MG/1
650 TABLET ORAL EVERY 6 HOURS PRN
Status: DISCONTINUED | OUTPATIENT
Start: 2022-12-31 | End: 2022-12-31 | Stop reason: HOSPADM

## 2022-12-31 RX ORDER — ONDANSETRON 4 MG/1
4 TABLET, ORALLY DISINTEGRATING ORAL EVERY 8 HOURS PRN
Status: DISCONTINUED | OUTPATIENT
Start: 2022-12-31 | End: 2023-01-04 | Stop reason: HOSPADM

## 2022-12-31 RX ORDER — METHYLPREDNISOLONE SODIUM SUCCINATE 125 MG/2ML
125 INJECTION, POWDER, LYOPHILIZED, FOR SOLUTION INTRAMUSCULAR; INTRAVENOUS ONCE
Status: COMPLETED | OUTPATIENT
Start: 2022-12-31 | End: 2022-12-31

## 2022-12-31 RX ORDER — ENOXAPARIN SODIUM 100 MG/ML
40 INJECTION SUBCUTANEOUS DAILY
Status: DISCONTINUED | OUTPATIENT
Start: 2022-12-31 | End: 2023-01-04 | Stop reason: HOSPADM

## 2022-12-31 RX ORDER — POLYETHYLENE GLYCOL 3350 17 G/17G
17 POWDER, FOR SOLUTION ORAL DAILY PRN
Status: DISCONTINUED | OUTPATIENT
Start: 2022-12-31 | End: 2022-12-31 | Stop reason: HOSPADM

## 2022-12-31 RX ORDER — ONDANSETRON 2 MG/ML
4 INJECTION INTRAMUSCULAR; INTRAVENOUS EVERY 6 HOURS PRN
Status: DISCONTINUED | OUTPATIENT
Start: 2022-12-31 | End: 2022-12-31 | Stop reason: HOSPADM

## 2022-12-31 RX ORDER — SODIUM CHLORIDE 0.9 % (FLUSH) 0.9 %
5-40 SYRINGE (ML) INJECTION EVERY 12 HOURS SCHEDULED
Status: DISCONTINUED | OUTPATIENT
Start: 2022-12-31 | End: 2023-01-04 | Stop reason: HOSPADM

## 2022-12-31 RX ORDER — ACETAMINOPHEN 325 MG/1
650 TABLET ORAL EVERY 6 HOURS PRN
Status: DISCONTINUED | OUTPATIENT
Start: 2022-12-31 | End: 2023-01-03 | Stop reason: ALTCHOICE

## 2022-12-31 RX ORDER — SODIUM CHLORIDE 9 MG/ML
INJECTION, SOLUTION INTRAVENOUS PRN
Status: DISCONTINUED | OUTPATIENT
Start: 2022-12-31 | End: 2022-12-31 | Stop reason: HOSPADM

## 2022-12-31 RX ORDER — ENOXAPARIN SODIUM 100 MG/ML
40 INJECTION SUBCUTANEOUS DAILY
Status: DISCONTINUED | OUTPATIENT
Start: 2022-12-31 | End: 2022-12-31 | Stop reason: HOSPADM

## 2022-12-31 RX ORDER — SODIUM CHLORIDE 0.9 % (FLUSH) 0.9 %
5-40 SYRINGE (ML) INJECTION EVERY 12 HOURS SCHEDULED
Status: DISCONTINUED | OUTPATIENT
Start: 2022-12-31 | End: 2022-12-31 | Stop reason: HOSPADM

## 2022-12-31 RX ORDER — FUROSEMIDE 10 MG/ML
40 INJECTION INTRAMUSCULAR; INTRAVENOUS DAILY
Status: DISCONTINUED | OUTPATIENT
Start: 2023-01-01 | End: 2023-01-01

## 2022-12-31 RX ORDER — ACETAMINOPHEN 650 MG/1
650 SUPPOSITORY RECTAL EVERY 6 HOURS PRN
Status: DISCONTINUED | OUTPATIENT
Start: 2022-12-31 | End: 2022-12-31 | Stop reason: HOSPADM

## 2022-12-31 RX ORDER — IPRATROPIUM BROMIDE AND ALBUTEROL SULFATE 2.5; .5 MG/3ML; MG/3ML
1 SOLUTION RESPIRATORY (INHALATION) ONCE
Status: COMPLETED | OUTPATIENT
Start: 2022-12-31 | End: 2022-12-31

## 2022-12-31 RX ORDER — MORPHINE SULFATE 4 MG/ML
4 INJECTION, SOLUTION INTRAMUSCULAR; INTRAVENOUS ONCE
Status: COMPLETED | OUTPATIENT
Start: 2022-12-31 | End: 2022-12-31

## 2022-12-31 RX ORDER — SODIUM CHLORIDE 9 MG/ML
INJECTION, SOLUTION INTRAVENOUS PRN
Status: DISCONTINUED | OUTPATIENT
Start: 2022-12-31 | End: 2023-01-04

## 2022-12-31 RX ORDER — SODIUM CHLORIDE 0.9 % (FLUSH) 0.9 %
5-40 SYRINGE (ML) INJECTION PRN
Status: DISCONTINUED | OUTPATIENT
Start: 2022-12-31 | End: 2022-12-31 | Stop reason: HOSPADM

## 2022-12-31 RX ORDER — NICOTINE 21 MG/24HR
1 PATCH, TRANSDERMAL 24 HOURS TRANSDERMAL DAILY
Status: DISCONTINUED | OUTPATIENT
Start: 2022-12-31 | End: 2023-01-04 | Stop reason: HOSPADM

## 2022-12-31 RX ORDER — GUAIFENESIN 100 MG/5ML
200 SYRUP ORAL EVERY 4 HOURS PRN
Status: DISCONTINUED | OUTPATIENT
Start: 2022-12-31 | End: 2023-01-04 | Stop reason: HOSPADM

## 2022-12-31 RX ADMIN — IOPAMIDOL 75 ML: 612 INJECTION, SOLUTION INTRAVENOUS at 09:37

## 2022-12-31 RX ADMIN — METHYLPREDNISOLONE SODIUM SUCCINATE 125 MG: 125 INJECTION, POWDER, FOR SOLUTION INTRAMUSCULAR; INTRAVENOUS at 11:32

## 2022-12-31 RX ADMIN — Medication 10 ML: at 22:17

## 2022-12-31 RX ADMIN — ENOXAPARIN SODIUM 40 MG: 100 INJECTION SUBCUTANEOUS at 17:11

## 2022-12-31 RX ADMIN — IPRATROPIUM BROMIDE AND ALBUTEROL SULFATE 1 AMPULE: .5; 3 SOLUTION RESPIRATORY (INHALATION) at 11:36

## 2022-12-31 RX ADMIN — GUAIFENESIN 200 MG: 200 SOLUTION ORAL at 18:24

## 2022-12-31 RX ADMIN — MORPHINE SULFATE 4 MG: 4 INJECTION, SOLUTION INTRAMUSCULAR; INTRAVENOUS at 11:33

## 2022-12-31 RX ADMIN — FUROSEMIDE 80 MG: 10 INJECTION, SOLUTION INTRAMUSCULAR; INTRAVENOUS at 11:33

## 2022-12-31 ASSESSMENT — PAIN DESCRIPTION - ORIENTATION
ORIENTATION: MID;LOWER
ORIENTATION: MID;LOWER

## 2022-12-31 ASSESSMENT — PAIN DESCRIPTION - FREQUENCY
FREQUENCY: CONTINUOUS
FREQUENCY: CONTINUOUS

## 2022-12-31 ASSESSMENT — PAIN DESCRIPTION - LOCATION
LOCATION: CHEST
LOCATION: CHEST

## 2022-12-31 ASSESSMENT — PAIN SCALES - GENERAL
PAINLEVEL_OUTOF10: 5
PAINLEVEL_OUTOF10: 0
PAINLEVEL_OUTOF10: 5
PAINLEVEL_OUTOF10: 0

## 2022-12-31 ASSESSMENT — PAIN DESCRIPTION - DESCRIPTORS
DESCRIPTORS: TIGHTNESS;PRESSURE
DESCRIPTORS: PRESSURE;TIGHTNESS

## 2022-12-31 ASSESSMENT — PAIN DESCRIPTION - PAIN TYPE
TYPE: ACUTE PAIN
TYPE: ACUTE PAIN

## 2022-12-31 ASSESSMENT — PAIN - FUNCTIONAL ASSESSMENT: PAIN_FUNCTIONAL_ASSESSMENT: 0-10

## 2022-12-31 NOTE — ED NOTES
Call from transfer center with Dr. Jerrica Mcintyre on the line to speak with Dr. Lynette Ramos.       Alexandro Porter  12/31/22 1089

## 2022-12-31 NOTE — ED NOTES
ED summary sent with patient.  Report called to [facility] 1wt room 11 [facility], [address] [place] [address]  12/31/22 9020

## 2022-12-31 NOTE — ED NOTES
Call from transfer center with bed assignment. Patient assigned to bed 178-1. Number for report is [phone].       Viktoria Awad  12/31/22 1211

## 2022-12-31 NOTE — PROGRESS NOTES
DVT / VTE PROPHYLAXIS EVALUATION    Estimated Creatinine Clearance: 116 mL/min (based on SCr of 0.97 mg/dL). Recent Labs     12/31/22  0854   BUN 16   CREATININE 0.97      HGB 14.8   HCT 44.4     ADMITTING DX OR CHIEF COMPLAINT?  Shortness of breath  WARFARIN? DOAC'S? no  ANY APPARENT BLEEDING? no  SCHEDULED SURGERY? no     Current order:  Enoxaparin 40 mg SUBQ once daily      Plan:  No intervention recommended, continue current VTE prophylaxis as ordered     Patient Weight (kg)      50.9 and below .9 101-150.9 151-174.9 175 or greater   Estimated   CrCl  (ml/min) 30 or greater []   30 mg   SUBQ daily   [x]   40 mg   SUBQ daily []  30 mg SUBQ   BID  []  40 mg   SUBQ   BID []  60mg SUBQ BID    15-29.9 []  UFH 5000   units SUBQ BID []  30 mg   SUBQ daily [] 30 mg SUBQ   daily []  40 mg SUBQ   daily [] 60 mg SUBQ   daily    Less than 15 or dialysis []  UFH 5000   units SUBQ BID [] UFH 5000 units SUBQ TID []  UFH 7500   units   SUBQ TID         Natalya Dobbs [facility] [place] PharmD

## 2022-12-31 NOTE — H&P
Hospital Medicine  History and Physical    Patient:  Pretty Gomez  MRN: [id number]    CHIEF COMPLAINT:  No chief complaint on file. History Obtained From:  Patient, EMR  Primary Care Physician: СЕРГЕЙ Weller CNP    HISTORY OF PRESENT ILLNESS:   The patient is a 40 y.o. male with PMH of remote cocaine abuse who presents with shortness of breath for 3 weeks    Patient reports shortness of breath for 3 weeks. He feels short of breath on walking minimal distance like going to the restroom. He reports orthopnea. When he lies down, he feels gurgly like there is water in the lungs. He reports that he had upper respiratory tract infection 3 weeks ago and was treated with antibiotic, steroids and an inhaler. He was coughing up yellow phlegm but the phlegm is clear now. He is a smoker and smokes 1 pack of cigarettes daily. He smokes marijuana. He has remote history of cocaine abuse but did not use cocaine for 20 years. In ED, he was tachycardic to 120. BNP was elevated to 3471. Troponin was negative. D-dimer was elevated. CTA chest was obtained which was negative for PE but showed small bilateral pleural effusions, prominent mediastinal lymph nodes and mild cardiomegaly. He was given 80 mg of IV Lasix and Solu-Medrol in the ED      Past Medical History:      Diagnosis Date    Lumbar radiculopathy 6/7/2018       Past Surgical History:      Procedure Laterality Date    CYST REMOVAL  2012    neck/back of head    OTHER SURGICAL HISTORY  1/17/13    pilonidol cyst    OTHER SURGICAL HISTORY  3/4/13    Repair of scalp wound dehiscence    OTHER SURGICAL HISTORY  3/4/13    exploration of scalp wound with ligation of bleeder    WISDOM TOOTH EXTRACTION         Medications Prior to Admission:    Prior to Admission medications    Medication Sig Start Date End Date Taking? Authorizing Provider   varenicline (CHANTIX STARTING MONTH PAK) 0.5 MG X 11 & 1 MG X 42 tablet Take by mouth.   Patient not taking: Reported on 12/31/2022 10/4/22   Saintclair Leeks, APRN - CNP   varenicline (CHANTIX CONTINUING MONTH CLEMENT) 1 MG tablet Take 1 tablet by mouth 2 times daily  Patient not taking: Reported on 12/31/2022 10/4/22   Saintclair Leeks, APRN - CNP       Allergies:  Vicodin [hydrocodone-acetaminophen] and Codeine    Social History:   TOBACCO:   reports that he has been smoking cigarettes. He has a 18.00 pack-year smoking history. He has never used smokeless tobacco.  ETOH:   reports current alcohol use. Family History:       Problem Relation Age of Onset    Diabetes Father     Cancer Neg Hx     Breast Cancer Neg Hx     Colon Cancer Neg Hx     Prostate Cancer Neg Hx     High Cholesterol Neg Hx     High Blood Pressure Neg Hx        REVIEW OF SYSTEMS:  Ten systems reviewed and negative except for stated in HPI    Physical Exam:    Vitals: BP (!) 119/104   Pulse (!) 117   Temp 98.2 °F (36.8 °C) (Oral)   Resp 20   Ht 5' 10\" (1.778 m)   Wt 209 lb (94.8 kg)   SpO2 92%   BMI 29.99 kg/m²   General appearance: alert, appears stated age and cooperative, moderate acute distress  Skin: Skin color, texture, turgor normal. No rashes or lesions  HEENT: Head: Normocephalic, no lesions, without obvious abnormality.   Neck: no adenopathy, no carotid bruit, no JVD, supple, symmetrical, trachea midline, and thyroid not enlarged, symmetric, no tenderness/mass/nodules  Lungs: clear to auscultation bilaterally  Heart:  Tachycardia, normal heart sounds, no murmur  Abdomen: soft, non-tender; bowel sounds normal; no masses,  no organomegaly  Extremities: extremities normal, atraumatic, no cyanosis or edema  Neurologic: Mental status: Alert, oriented, thought content appropriate     Recent Labs     12/31/22  0854   WBC 7.7   HGB 14.8        Recent Labs     12/31/22  0854      K 4.2   *   CO2 21   BUN 16   CREATININE 0.97   GLUCOSE 142*     Troponin T:   Recent Labs     12/31/22  0854   TROPONINI <0.010       ABGs: No results found for: PHART, PO2ART, CRI7NPB  INR: No results for input(s): INR in the last 72 hours. URINALYSIS:No results for input(s): NITRITE, COLORU, PHUR, LABCAST, WBCUA, RBCUA, MUCUS, TRICHOMONAS, YEAST, BACTERIA, CLARITYU, SPECGRAV, LEUKOCYTESUR, UROBILINOGEN, BILIRUBINUR, BLOODU, GLUCOSEU, AMORPHOUS in the last 72 hours. Invalid input(s): Jovita Boor  -----------------------------------------------------------------   XR CHEST (2 VW)    Result Date: 12/31/2022  EXAMINATION: TWO XRAY VIEWS OF THE CHEST 12/31/2022 9:09 am COMPARISON: None. HISTORY: ORDERING SYSTEM PROVIDED HISTORY: shortness TECHNOLOGIST PROVIDED HISTORY: Reason for exam:->shortness What reading provider will be dictating this exam?->CRC FINDINGS: The cardiac silhouette is within normals. Ground-glass airspace disease is noted. There is no focal consolidation. There is no pleural effusion. There is no pneumothorax. 1. Bilateral hazy ground-glass airspace disease which could represent atypical pneumonia or viral pneumonia. CTA CHEST W WO CONTRAST PE Eval    Result Date: 12/31/2022  EXAMINATION: CTA OF THE CHEST WITH AND WITHOUT CONTRAST 12/31/2022 9:38 am TECHNIQUE: CTA of the chest was performed before and after the administration of intravenous contrast.  Multiplanar reformatted images are provided for review. MIP images are provided for review. Automated exposure control, iterative reconstruction, and/or weight based adjustment of the mA/kV was utilized to reduce the radiation dose to as low as reasonably achievable. COMPARISON: Correlation is made with chest radiographs performed earlier in the day. HISTORY: ORDERING SYSTEM PROVIDED HISTORY: Shortness of breath and elevated D-dimer. TECHNOLOGIST PROVIDED HISTORY: Reason for exam:->Shortness of breath and elevated D-dimer.  Decision Support Exception - unselect if not a suspected or confirmed emergency medical condition->Emergency Medical Condition (MA) What reading provider will be dictating this exam?->CRC FINDINGS: Pulmonary Arteries: Pulmonary arteries are adequately opacified for evaluation. No evidence of intraluminal filling defect to suggest pulmonary embolism. Main pulmonary artery is normal in caliber. Mediastinum: A multitude of small (up to 1.2 cm in transverse diameter) lymph nodes are identified throughout the mediastinum, including the superior, anterior, aortopulmonary window, and paratracheal spaces. There is ill-defined soft tissue density noted in the subcarinal region, suspicious for conglomeration of enlarged lymph nodes. Question inflammatory or neoplastic. PET/CT can be performed for further evaluation. No axillary or hilar lymphadenopathy is seen. The thoracic aorta is normal in caliber. Mild cardiomegaly is appreciated. The thyroid gland, esophagus, and trachea are unremarkable. Lungs/pleura: Small bilateral pleural effusions are noted. There is no evidence of acute consolidation or infiltrate. Interlobular septal thickening is identified in the lower lobes. Given the mild cardiomegaly and pleural effusions, the presence of mild pulmonary edema cannot be excluded, however, given the prominent mediastinal lymph nodes, lymphangitic carcinomatosis cannot be excluded. Mild centrilobular emphysema is noted. No pulmonary parenchymal nodule or mass is identified. Upper Abdomen: No acute abnormality is noted. Soft Tissues/Bones: Minimal osteo-degenerative changes are noted involving the thoracic spine. 1.  No evidence of pulmonary embolism or acute pulmonary abnormality. 2.  Prominent mediastinal lymph nodes, as described above. Question inflammatory or neoplastic. PET/CT can be performed for further evaluation. 3.  Small bilateral pleural effusions. 4.  Mild cardiomegaly. 5. Interlobular septal thickening in the lower lobes.   Given the mild cardiomegaly and pleural effusions, pulmonary edema cannot be excluded, however, given the prominent mediastinal lymph nodes, lymphangitic carcinomatosis cannot be excluded. 6.  Mild centrilobular emphysema. Assessment and Plan         Patient Active Problem List   Diagnosis Code    Pilonidal cyst with abscess L05.01    Scalp cyst L72.9    Dehiscence of operative wound T81. 31XA    Lumbar radiculopathy M54.16    Herniated lumbar intervertebral disc M51.26    Heart failure (HCC) I50.9    Respiratory failure ([address]      72-year-old male with a history of remote cocaine abuse who presented with shortness of breath. Shortness of breath  -BNP is elevated to 3470 and a CT chest showed small bilateral pleural effusions suggestive of heart failure. Cannot rule out atypical pneumonia.  -Continue diuresis with IV Lasix 40 mg daily  -Echo  -Cardiology consult  -Check procalcitonin  -Influenza and COVID-negative. Mediastinal lymphadenopathy  -Likely reactive from recent infection  -Pulmonary consult    Nicotine abuse-nicotine patch      Sarah Pichardo MD, MD  Admitting Hospitalist    TTS: 85mins where I focused more than 75% of my attention on rendering care, and planning treatment course for this patient, in addition to talking to RN team, mid levels, consulting with other physicians and following up on labs and imaging. High Risk Readmission Screening Tool Score Noted.      Emergency Contact:

## 2022-12-31 NOTE — FLOWSHEET NOTE
Patient arrived to [address] [place] [address] from [facility] via squad. Pt is alert and oriented x 4. Admission in progress. Pt denies pain. Call light in pts reach. Pt is up in the room, steady gait. Visitors in the room.

## 2022-12-31 NOTE — ED NOTES
Called transfer center to lisa hospitalist at [facility] for Dr to  consult.       Katheryn [facility]  12/31/22 [id number]

## 2022-12-31 NOTE — ED TRIAGE NOTES
Patient with shortness of breath since July after going under in [employer] expedition.  He has had problems on and off with recent increase

## 2022-12-31 NOTE — ED PROVIDER NOTES
[facility] ED  EMERGENCY DEPARTMENT ENCOUNTER      Pt Name: Maryjo Gardner  MRN: [id number]  [place] 1980  Date of evaluation: 12/31/2022  Provider: Andrew Roach MD        HISTORY OF PRESENT ILLNESS    Maryjo Gardner is a 40 y.o. male per chart review has ah/o SOB. He states he has a history of recreational drugs including cocaine. He has had progessive SOB and he can't sleep because when he lies flat he can't breathe. The history is provided by the patient. Shortness of Breath  Severity:  Severe  Onset quality:  Sudden  Timing:  Constant  Progression:  Worsening  Chronicity:  New  Relieved by:  Sitting up  Worsened by:  Deep breathing, activity, exertion, movement and emotional stress  Ineffective treatments:  Lying down and rest  Associated symptoms: no abdominal pain, no chest pain, no cough, no ear pain, no fever, no neck pain, no rash, no sore throat and no vomiting    Risk factors: alcohol use and tobacco use           REVIEW OF SYSTEMS       Review of Systems   Constitutional:  Negative for chills and fever. HENT:  Negative for ear pain and sore throat. Eyes:  Negative for discharge and redness. Respiratory:  Positive for shortness of breath. Negative for cough. Cardiovascular:  Negative for chest pain and palpitations. Gastrointestinal:  Negative for abdominal pain, nausea and vomiting. Genitourinary:  Negative for difficulty urinating and dysuria. Musculoskeletal:  Negative for back pain and neck pain. Skin:  Negative for rash and wound. Neurological:  Negative for dizziness and syncope. Psychiatric/Behavioral:  Negative for confusion. The patient is not nervous/anxious. All other systems reviewed and are negative. Except as noted above the remainder of the review of systems was reviewed and negative.        PAST MEDICAL HISTORY     Past Medical History:   Diagnosis Date    Lumbar radiculopathy 6/7/2018         SURGICAL HISTORY       Past Surgical History: Procedure Laterality Date    CYST REMOVAL  2012    neck/back of head    OTHER SURGICAL HISTORY  1/17/13    pilonidol cyst    OTHER SURGICAL HISTORY  3/4/13    Repair of scalp wound dehiscence    OTHER SURGICAL HISTORY  3/4/13    exploration of scalp wound with ligation of bleeder    WISDOM TOOTH EXTRACTION           CURRENT MEDICATIONS       Discharge Medication List as of 12/31/2022  1:56 PM        CONTINUE these medications which have NOT CHANGED    Details   varenicline (CHANTIX STARTING MONTH PAK) 0.5 MG X 11 & 1 MG X 42 tablet Take by mouth., Disp-53 tablet, R-0Normal      varenicline (CHANTIX CONTINUING MONTH PAK) 1 MG tablet Take 1 tablet by mouth 2 times daily, Disp-60 tablet, R-2Normal             ALLERGIES     Vicodin [hydrocodone-acetaminophen] and Codeine    FAMILY HISTORY       Family History   Problem Relation Age of Onset    Diabetes Father     Cancer Neg Hx     Breast Cancer Neg Hx     Colon Cancer Neg Hx     Prostate Cancer Neg Hx     High Cholesterol Neg Hx     High Blood Pressure Neg Hx           SOCIAL HISTORY       Social History     Socioeconomic History    Marital status: Single     Spouse name: None    Number of children: None    Years of education: None    Highest education level: None   Tobacco Use    Smoking status: Every Day     Packs/day: 1.00     Years: 18.00     Pack years: 18.00     Types: Cigarettes    Smokeless tobacco: Never   Substance and Sexual Activity    Alcohol use: Yes    Drug use: Yes     Types: Marijuana [place])    Sexual activity: Not Currently     Social Determinants of Health     Financial Resource Strain: Low Risk     Difficulty of Paying Living Expenses: Not hard at all   Food Insecurity: No Food Insecurity    Worried About Running Out of Food in the Last Year: Never true    Ran Out of Food in the Last Year: Never true         PHYSICAL EXAM       ED Triage Vitals [12/31/22 0833]   BP Temp Temp Source Heart Rate Resp SpO2 Height Weight   (!) 142/120 97.6 °F (36.4 °C) Temporal (!) 120 20 96 % 5' 10\" (1.778 m) 205 lb (93 kg)       Physical Exam  Vitals and nursing note reviewed. Constitutional:       Appearance: Normal appearance. HENT:      Head: Normocephalic and atraumatic. Right Ear: Tympanic membrane normal.      Left Ear: Tympanic membrane normal.      Nose: Nose normal.      Mouth/Throat:      Mouth: Mucous membranes are moist.      Pharynx: Oropharynx is clear. Eyes:      General: Lids are normal.      Extraocular Movements: Extraocular movements intact. Conjunctiva/sclera: Conjunctivae normal.      Pupils: Pupils are equal, round, and reactive to light. Cardiovascular:      Rate and Rhythm: Regular rhythm. Tachycardia present. Pulses: Normal pulses. Heart sounds: Normal heart sounds. Pulmonary:      Effort: Pulmonary effort is normal.      Breath sounds: Normal breath sounds. Abdominal:      General: Abdomen is flat. Bowel sounds are normal.      Palpations: Abdomen is soft. Musculoskeletal:         General: Normal range of motion. Cervical back: Full passive range of motion without pain, normal range of motion and neck supple. Skin:     General: Skin is warm. Capillary Refill: Capillary refill takes less than 2 seconds. Neurological:      General: No focal deficit present. Mental Status: He is alert and oriented to person, place, and time. Deep Tendon Reflexes: Reflexes are normal and symmetric. Psychiatric:         Attention and Perception: Attention and perception normal.         Mood and Affect: Mood normal.         Behavior: Behavior normal. Behavior is cooperative.          LABS:  Labs Reviewed   BASIC METABOLIC PANEL - Abnormal; Notable for the following components:       Result Value    Chloride 108 (*)     Glucose 142 (*)     All other components within normal limits   CBC WITH AUTO DIFFERENTIAL - Abnormal; Notable for the following components:    MCV 95.9 (*)     Neutrophils % 69.4 (*)     Monocytes % 5.1 (*)     All other components within normal limits   D-DIMER, QUANTITATIVE - Abnormal; Notable for the following components:    D-Dimer, Quant 0.80 (*)     All other components within normal limits    Narrative:     Gloria NIELSEN tel. [phone],  Coag results called to and read back by ceferino, 12/31/2022 09:19, by IGGY [email or web address]-[email or web address], RAPID   RAPID INFLUENZA A/B ANTIGENS   TROPONIN   BRAIN NATRIURETIC PEPTIDE         MDM:   Vitals:    Vitals:    12/31/22 0833 12/31/22 1045 12/31/22 1100 12/31/22 1222   BP: (!) 142/120 (!) 130/107 (!) 130/112 (!) 126/99   Pulse: (!) 120  (!) 114 (!) 105   Resp: 20  20    Temp: 97.6 °F (36.4 °C)      TempSrc: Temporal      SpO2: 96% 97% 96% 98%   Weight: 205 lb (93 kg)      Height: 5' 10\" (1.778 m)          MDM  Number of Diagnoses or Management Options  Acute pulmonary edema (HCC)  COPD exacerbation (HCC)  Mediastinal adenopathy  Respiratory distress  Diagnosis management comments: Patient presents with SOB. Labs,EKG, CXR ordered. The patient will be transferred to Dr. Dev Fu at change of shift         CTA CHEST W WO CONTRAST PE Eval   Final Result   1. No evidence of pulmonary embolism or acute pulmonary abnormality. 2.  Prominent mediastinal lymph nodes, as described above. Question   inflammatory or neoplastic. PET/CT can be performed for further evaluation. 3.  Small bilateral pleural effusions. 4.  Mild cardiomegaly. 5. Interlobular septal thickening in the lower lobes. Given the mild   cardiomegaly and pleural effusions, pulmonary edema cannot be excluded,   however, given the prominent mediastinal lymph nodes, lymphangitic   carcinomatosis cannot be excluded. 6.  Mild centrilobular emphysema. XR CHEST (2 VW)   Final Result   1. Bilateral hazy ground-glass airspace disease which could represent   atypical pneumonia or viral pneumonia.                EKG Interpretation    Twelve-lead EKG shows sinus tachycardia, rate 119 bpm, normal intervals, normal electrical axis, no acute ST-T wave changes. Interpreted by emergency department physian      Clinical Impression:     Andrew Roach MD     The lab results, radiology and test results were reviewed with the patient and family. The patient will follow up in 2 days with their primary care doctor. If their symptoms change or get worse they will return to the ER. CRITICAL CARE TIME   Total CriticalCare time was  minutes, excluding separately reportable procedures. There was a high probability of clinically significant/life threatening deterioration in the patient's condition which required my urgent intervention. PROCEDURES:  Unlessotherwise noted below, none     Procedures      FINAL IMPRESSION      1. COPD exacerbation ([facility] [address]    2. Acute pulmonary edema (HCC)    3. Respiratory distress    4.  Mediastinal adenopathy          DISPOSITION/PLAN   DISPOSITION Decision To Transfer 12/31/2022 11:25:29 AM          Andrew Roach MD (electronically signed)  Attending Emergency Physician          Andrew Roach MD  01/01/23 [id number]

## 2023-01-01 PROBLEM — J90 BILATERAL PLEURAL EFFUSION: Status: ACTIVE | Noted: 2023-01-01

## 2023-01-01 PROBLEM — R59.0 MEDIASTINAL LYMPHADENOPATHY: Status: ACTIVE | Noted: 2023-01-01

## 2023-01-01 PROBLEM — Z72.0 TOBACCO ABUSE: Status: ACTIVE | Noted: 2023-01-01

## 2023-01-01 PROBLEM — J43.2 CENTRILOBULAR EMPHYSEMA (HCC): Status: ACTIVE | Noted: 2023-01-01

## 2023-01-01 PROBLEM — R06.02 SHORTNESS OF BREATH: Status: ACTIVE | Noted: 2023-01-01

## 2023-01-01 LAB
ALBUMIN SERPL-MCNC: 3.6 G/DL (ref 3.5–4.6)
ALP BLD-CCNC: 69 U/L (ref 35–104)
ALT SERPL-CCNC: 20 U/L (ref 0–41)
ANION GAP SERPL CALCULATED.3IONS-SCNC: 12 MEQ/L (ref 9–15)
AST SERPL-CCNC: 13 U/L (ref 0–40)
BASOPHILS ABSOLUTE: 0 K/UL (ref 0–0.2)
BASOPHILS RELATIVE PERCENT: 0.1 %
BILIRUB SERPL-MCNC: 0.8 MG/DL (ref 0.2–0.7)
BUN BLDV-MCNC: 15 MG/DL (ref 6–20)
CALCIUM SERPL-MCNC: 8.8 MG/DL (ref 8.5–9.9)
CHLORIDE BLD-SCNC: 103 MEQ/L (ref 95–107)
CO2: 23 MEQ/L (ref 20–31)
CREAT SERPL-MCNC: 1.16 MG/DL (ref 0.7–1.2)
EOSINOPHILS ABSOLUTE: 0 K/UL (ref 0–0.7)
EOSINOPHILS RELATIVE PERCENT: 0.1 %
GFR SERPL CREATININE-BSD FRML MDRD: >60 ML/MIN/{1.73_M2}
GLOBULIN: 1.9 G/DL (ref 2.3–3.5)
GLUCOSE BLD-MCNC: 145 MG/DL (ref 70–99)
HCT VFR BLD CALC: 44.1 % (ref 42–52)
HEMOGLOBIN: 14.3 G/DL (ref 14–18)
LYMPHOCYTES ABSOLUTE: 1.3 K/UL (ref 1–4.8)
LYMPHOCYTES RELATIVE PERCENT: 11.9 %
MAGNESIUM: 1.7 MG/DL (ref 1.7–2.4)
MCH RBC QN AUTO: 31.7 PG (ref 27–31.3)
MCHC RBC AUTO-ENTMCNC: 32.5 % (ref 33–37)
MCV RBC AUTO: 97.5 FL (ref 79–92.2)
MONOCYTES ABSOLUTE: 0.7 K/UL (ref 0.2–0.8)
MONOCYTES RELATIVE PERCENT: 6.3 %
NEUTROPHILS ABSOLUTE: 9.2 K/UL (ref 1.4–6.5)
NEUTROPHILS RELATIVE PERCENT: 81.6 %
PDW BLD-RTO: 14.1 % (ref 11.5–14.5)
PLATELET # BLD: 281 K/UL (ref 130–400)
POTASSIUM SERPL-SCNC: 4.1 MEQ/L (ref 3.4–4.9)
RBC # BLD: 4.53 M/UL (ref 4.7–6.1)
SODIUM BLD-SCNC: 138 MEQ/L (ref 135–144)
TOTAL PROTEIN: 5.5 G/DL (ref 6.3–8)
WBC # BLD: 11.2 K/UL (ref 4.8–10.8)

## 2023-01-01 PROCEDURE — 83735 ASSAY OF MAGNESIUM: CPT

## 2023-01-01 PROCEDURE — 99221 1ST HOSP IP/OBS SF/LOW 40: CPT | Performed by: INTERNAL MEDICINE

## 2023-01-01 PROCEDURE — 80053 COMPREHEN METABOLIC PANEL: CPT

## 2023-01-01 PROCEDURE — 6370000000 HC RX 637 (ALT 250 FOR IP): Performed by: INTERNAL MEDICINE

## 2023-01-01 PROCEDURE — 2700000000 HC OXYGEN THERAPY PER DAY

## 2023-01-01 PROCEDURE — 85025 COMPLETE CBC W/AUTO DIFF WBC: CPT

## 2023-01-01 PROCEDURE — 6360000002 HC RX W HCPCS: Performed by: INTERNAL MEDICINE

## 2023-01-01 PROCEDURE — 1210000000 HC MED SURG R&B

## 2023-01-01 PROCEDURE — 99222 1ST HOSP IP/OBS MODERATE 55: CPT | Performed by: INTERNAL MEDICINE

## 2023-01-01 PROCEDURE — 36415 COLL VENOUS BLD VENIPUNCTURE: CPT

## 2023-01-01 PROCEDURE — 2580000003 HC RX 258: Performed by: INTERNAL MEDICINE

## 2023-01-01 RX ORDER — FUROSEMIDE 10 MG/ML
20 INJECTION INTRAMUSCULAR; INTRAVENOUS 2 TIMES DAILY
Status: DISCONTINUED | OUTPATIENT
Start: 2023-01-01 | End: 2023-01-04

## 2023-01-01 RX ORDER — MAGNESIUM SULFATE IN WATER 40 MG/ML
2000 INJECTION, SOLUTION INTRAVENOUS ONCE
Status: COMPLETED | OUTPATIENT
Start: 2023-01-01 | End: 2023-01-01

## 2023-01-01 RX ORDER — FUROSEMIDE 10 MG/ML
20 INJECTION INTRAMUSCULAR; INTRAVENOUS DAILY
Status: DISCONTINUED | OUTPATIENT
Start: 2023-01-01 | End: 2023-01-01

## 2023-01-01 RX ADMIN — ENOXAPARIN SODIUM 40 MG: 100 INJECTION SUBCUTANEOUS at 09:41

## 2023-01-01 RX ADMIN — FUROSEMIDE 20 MG: 10 INJECTION, SOLUTION INTRAMUSCULAR; INTRAVENOUS at 09:39

## 2023-01-01 RX ADMIN — FUROSEMIDE 20 MG: 10 INJECTION, SOLUTION INTRAMUSCULAR; INTRAVENOUS at 18:13

## 2023-01-01 RX ADMIN — Medication 10 ML: at 09:42

## 2023-01-01 RX ADMIN — MAGNESIUM SULFATE HEPTAHYDRATE 2000 MG: 2 INJECTION, SOLUTION INTRAVENOUS at 19:22

## 2023-01-01 RX ADMIN — Medication 10 ML: at 21:31

## 2023-01-01 NOTE — PLAN OF CARE
Problem: Discharge Planning  Goal: Discharge to home or other facility with appropriate resources  Outcome: Progressing  Flowsheets (Taken 12/31/2022 1442 by Jesusita Curran RN)  Discharge to home or other facility with appropriate resources: Identify barriers to discharge with patient and caregiver

## 2023-01-01 NOTE — CONSULTS
Inpatient consult to Cardiology  Consult performed by:  Mj Ryan MD  Consult ordered by: Serene Potts MD    Patient Name: Levy Corral Date: 2022  2:08 PM  MR #: [id number]  : 1980    Attending Physician: Serene Potts MD  Reason for consult: Heart failure management    History of Presenting Illness:    Sakina Mckinnon is a 40 y.o. male on hospital day 1 with a history of tobacco abuse admitted to the hospital for shortness of breath  Cardiology consulted for the management of heart failure    BNP 3400  Troponins negative  CT chest negative for PE but positive for bilateral pleural effusions    History Obtained From:  patient, electronic medical record  History:      Past Medical History:   Diagnosis Date    Lumbar radiculopathy 2018     Past Surgical History:   Procedure Laterality Date    CYST REMOVAL      neck/back of head    OTHER SURGICAL HISTORY  13    pilonidol cyst    OTHER SURGICAL HISTORY  3/4/13    Repair of scalp wound dehiscence    OTHER SURGICAL HISTORY  3/4/13    exploration of scalp wound with ligation of bleeder    WISDOM TOOTH EXTRACTION       Family History  Family History   Problem Relation Age of Onset    Diabetes Father     Cancer Neg Hx     Breast Cancer Neg Hx     Colon Cancer Neg Hx     Prostate Cancer Neg Hx     High Cholesterol Neg Hx     High Blood Pressure Neg Hx      Social History     Socioeconomic History    Marital status: Single     Spouse name: Not on file    Number of children: Not on file    Years of education: Not on file    Highest education level: Not on file   Occupational History    Not on file   Tobacco Use    Smoking status: Every Day     Packs/day: 1.00     Years: 18.00     Pack years: 18.00     Types: Cigarettes    Smokeless tobacco: Never   Substance and Sexual Activity    Alcohol use: Yes    Drug use: Yes     Types: Marijuana Amy Lux)    Sexual activity: Not Currently   Other Topics Concern    Not on file   Social History Narrative    Not on file     Social Determinants of Health     Financial Resource Strain: Low Risk     Difficulty of Paying Living Expenses: Not hard at all   Food Insecurity: No Food Insecurity    Worried About Running Out of Food in the Last Year: Never true    Ran Out of Food in the Last Year: Never true   Transportation Needs: Not on file   Physical Activity: Not on file   Stress: Not on file   Social Connections: Not on file   Intimate Partner Violence: Not on file   Housing Stability: Not on file     Home Medications:      Medications Prior to Admission: varenicline (CHANTIX STARTING MONTH CLEMENT) 0.5 MG X 11 & 1 MG X 42 tablet, Take by mouth. (Patient not taking: Reported on 12/31/2022)  varenicline (CHANTIX CONTINUING MONTH CLEMENT) 1 MG tablet, Take 1 tablet by mouth 2 times daily (Patient not taking: Reported on 12/31/2022)    Current Hospital Medications:   Scheduled Meds:   furosemide  20 mg IntraVENous Daily    sodium chloride flush  5-40 mL IntraVENous 2 times per day    enoxaparin  40 mg SubCUTAneous Daily    nicotine  1 patch TransDERmal Daily     Continuous Infusions:   sodium chloride       PRN Meds:.sodium chloride flush, sodium chloride, ondansetron **OR** ondansetron, polyethylene glycol, acetaminophen **OR** acetaminophen, guaiFENesin   sodium chloride        Allergies: Allergies   Allergen Reactions    Vicodin [Hydrocodone-Acetaminophen] Nausea Only and Other (See Comments)     HEADACHES    Codeine Nausea And Vomiting      Review of Systems:       [x] CV, Resp, Neuro, , and all other systems reviewed and negative other than listed in HPI.      Objective Findings:     Vitals:   Vitals:    12/31/22 1416 01/01/23 0109 01/01/23 0759   BP: (!) 119/104 105/82 113/81   Pulse:  (!) 108 (!) 108   Resp: 20 18 18   Temp: 98.2 °F (36.8 °C) 98.1 °F (36.7 °C) 98.2 °F (36.8 °C)   TempSrc: Oral Oral Oral   SpO2: 92% 91% 98%   Weight: 209 lb (94.8 kg)     Height: 5' 10\" (1.778 m)          Physical Examination:  General: Alert oriented x4 not acute distress  HEENT: Normocephalic, no scleral icterus. Neck: No JVD. Heart: Regular, no murmur, no rub/gallop. No RV heave. Lungs: Clear to ascultation, no rales/wheezing/rhonchi. Good chest wall excursion. Abdomen: Soft nontender nondistended  Extremities: No clubbing/cyanosis, no edema. Skin: Warm, dry, normal turgor, no rash, no bruise, no petichiae. Neuro: No myoclonus or tremor. Psych: Normal affect    Results/ Medications reviewed 1/1/2023, 9:42 AM     Laboratory, Microbiology, Pathology, Radiology, Cardiology, Medications and Transcriptions reviewed  Scheduled Meds:   furosemide  20 mg IntraVENous Daily    sodium chloride flush  5-40 mL IntraVENous 2 times per day    enoxaparin  40 mg SubCUTAneous Daily    nicotine  1 patch TransDERmal Daily     Continuous Infusions:   sodium chloride         Recent Labs     12/31/22  0854 01/01/23  0532   WBC 7.7 11.2*   HGB 14.8 14.3   HCT 44.4 44.1   MCV 95.9* 97.5*    281     Recent Labs     12/31/22  0854 01/01/23  0532    138   K 4.2 4.1   * 103   CO2 21 23   BUN 16 15   CREATININE 0.97 1.16     Recent Labs     01/01/23  0532   PROT 5.5*     No results found for this or any previous visit. Impression:   Acute decompensated heart failure with unknown ejection fraction  History of tobacco abuse  Plan:   Continue telemetry  Continue Lasix 20 mg IV twice daily and uptitrate as needed  Strict ins and outs and daily weights  Please keep potassium between 4 and 5 magnesium above 2  Please keep hemoglobin above 8  Obtain an echocardiogram  Given that this is a new decompensated heart failure I will hold off on beta-blockers until patient is more euvolemic  Hold ACE inhibitor/ARB in the setting of borderline low blood pressure  Comments: Thank you for allowing us to participate in the care of this patient. Will continue to follow. Please call if questions or concerns arise.     Electronically signed by Demetrius Pratt MD on 1/1/2023 at 9:42 AM    Please note this report has been partially produced using speech recognition software and may cause contain errors related to that system including grammar, punctuation and spelling as well as words and phrases that may seem inappropriate. If there are questions or concerns please feel free to contact me to clarify.

## 2023-01-01 NOTE — CONSULTS
Pulmonary Medicine  Consult Note      Reason for consultation: Mediastinal adenopathy, atypical pneumonia    Consulting physician: Dr. Becky Pierce:      This is 81-year-old male with past medical history significant for lumbar radiculopathy, remote cocaine abuse 20 years ago, history of tobacco abuse and marijuana use was presented in ER with increased shortness of breath. He said he never had a breathing problem before. In last 3 weeks he has been having more shortness of breath which worsened even going to the restroom. He also felt orthopnea when he lies down he feels gurgly and feels water in lungs. He said he had upper respiratory tract infection 3 weeks ago with treated with antibiotic steroid and inhaler and at that time he was coughing yellow mucus which is clear now. He still smoke 1 pack a day and occasionally smokes marijuana. .  Patient came to ER with increased shortness of breath at that time found having tachycardic with heart rate of 120 BNP was elevated at 3471, troponin was negative but D-dimer was elevated CT chest was obtained and if negative for PE shows bilateral pleural effusion prominent mediastinal lymph nodes mild cardiomegaly. He was given IV Lasix 80 mg and Solu-Medrol. He states his shortness of breath significantly improved he was able to sleep last night and he feels much better. Pulmonary consulted due to mediastinal adenopathy          Past Medical History:        Diagnosis Date    Lumbar radiculopathy 6/7/2018       Past Surgical History:        Procedure Laterality Date    CYST REMOVAL  2012    neck/back of head    OTHER SURGICAL HISTORY  1/17/13    pilonidol cyst    OTHER SURGICAL HISTORY  3/4/13    Repair of scalp wound dehiscence    OTHER SURGICAL HISTORY  3/4/13    exploration of scalp wound with ligation of bleeder    WISDOM TOOTH EXTRACTION         Social History:     reports that he has been smoking cigarettes.  He has a 18.00 pack-year smoking history. He has never used smokeless tobacco. He reports current alcohol use. He reports current drug use. Drug: Marijuana Rachid Fleming). Family History:       Problem Relation Age of Onset    Diabetes Father     Cancer Neg Hx     Breast Cancer Neg Hx     Colon Cancer Neg Hx     Prostate Cancer Neg Hx     High Cholesterol Neg Hx     High Blood Pressure Neg Hx        Allergies:  Vicodin [hydrocodone-acetaminophen] and Codeine        MEDICATIONS during current hospitalization:    Continuous Infusions:   sodium chloride         Scheduled Meds:   furosemide  20 mg IntraVENous BID    sodium chloride flush  5-40 mL IntraVENous 2 times per day    enoxaparin  40 mg SubCUTAneous Daily    nicotine  1 patch TransDERmal Daily       PRN Meds:sodium chloride flush, sodium chloride, ondansetron **OR** ondansetron, polyethylene glycol, acetaminophen **OR** acetaminophen, guaiFENesin    REVIEW OF SYSTEMS:  As in history of present illness  Other 14 point review of system is negative. PHYSICAL EXAM:    Vitals:  /73   Pulse (!) 110   Temp 98.3 °F (36.8 °C) (Oral)   Resp 18   Ht 5' 10\" (1.778 m)   Wt 209 lb (94.8 kg)   SpO2 98%   BMI 29.99 kg/m²   General:   Alert, awake, Oriented x3  .comfortable in bed, No distress. Head: Atraumatic , Normocephalic   Eyes: PERRL. No sclera icterus. No conjunctival injection. No discharge   ENT: No nasal  discharge. Pharynx clear. Neck:  Trachea midline. No thyromegaly, no JVD, No cervical adenopathy. Chest : Bilaterally symmetrical ,Normal effort,  No accessory muscle use  Lung : Diminished BS bilateraly. No Rales. No wheezing. No rhonchi. Heart[de-identified] Normal  rate. Regular rhythm. No mumur ,  Rub or gallop  ABD: Non-tender. Non-distended. No masses. No organmegaly. Normal bowel sounds. No hernia.   Musculoskeleton: normal range of motion in all extremites, strength and tone Extremities: No pitting in both lower leg , No Cyanosis ,No clubbing  Neuro: no cranial nerve abnormality, normal reflex and sensation, no focal weakness   Skin: Warm and dry. No erythema rash on exposed extremities. Data Review  Recent Labs     12/31/22  0854 01/01/23  0532   WBC 7.7 11.2*   HGB 14.8 14.3   HCT 44.4 44.1    281      Recent Labs     12/31/22  0854 01/01/23  0532    138   K 4.2 4.1   * 103   CO2 21 23   BUN 16 15   CREATININE 0.97 1.16   GLUCOSE 142* 145*       MV Settings: ABGs: No results for input(s): PHART, SFH8SEU, PO2ART, CUA9WFG, BEART, M7VJJQQO, MTX1MCW in the last 72 hours. O2 Device: None (Room air)  No results found for: LACTA    Radiology  XR CHEST (2 VW)    Result Date: 12/31/2022  EXAMINATION: TWO XRAY VIEWS OF THE CHEST 12/31/2022 9:09 am COMPARISON: None. HISTORY: ORDERING SYSTEM PROVIDED HISTORY: shortness TECHNOLOGIST PROVIDED HISTORY: Reason for exam:->shortness What reading provider will be dictating this exam?->CRC FINDINGS: The cardiac silhouette is within normals. Ground-glass airspace disease is noted. There is no focal consolidation. There is no pleural effusion. There is no pneumothorax. 1. Bilateral hazy ground-glass airspace disease which could represent atypical pneumonia or viral pneumonia. CTA CHEST W WO CONTRAST PE Eval    Result Date: 12/31/2022  EXAMINATION: CTA OF THE CHEST WITH AND WITHOUT CONTRAST 12/31/2022 9:38 am TECHNIQUE: CTA of the chest was performed before and after the administration of intravenous contrast.  Multiplanar reformatted images are provided for review. MIP images are provided for review. Automated exposure control, iterative reconstruction, and/or weight based adjustment of the mA/kV was utilized to reduce the radiation dose to as low as reasonably achievable. COMPARISON: Correlation is made with chest radiographs performed earlier in the day. HISTORY: ORDERING SYSTEM PROVIDED HISTORY: Shortness of breath and elevated D-dimer.  TECHNOLOGIST PROVIDED HISTORY: Reason for exam:->Shortness of breath and elevated D-dimer. Decision Support Exception - unselect if not a suspected or confirmed emergency medical condition->Emergency Medical Condition (MA) What reading provider will be dictating this exam?->CRC FINDINGS: Pulmonary Arteries: Pulmonary arteries are adequately opacified for evaluation. No evidence of intraluminal filling defect to suggest pulmonary embolism. Main pulmonary artery is normal in caliber. Mediastinum: A multitude of small (up to 1.2 cm in transverse diameter) lymph nodes are identified throughout the mediastinum, including the superior, anterior, aortopulmonary window, and paratracheal spaces. There is ill-defined soft tissue density noted in the subcarinal region, suspicious for conglomeration of enlarged lymph nodes. Question inflammatory or neoplastic. PET/CT can be performed for further evaluation. No axillary or hilar lymphadenopathy is seen. The thoracic aorta is normal in caliber. Mild cardiomegaly is appreciated. The thyroid gland, esophagus, and trachea are unremarkable. Lungs/pleura: Small bilateral pleural effusions are noted. There is no evidence of acute consolidation or infiltrate. Interlobular septal thickening is identified in the lower lobes. Given the mild cardiomegaly and pleural effusions, the presence of mild pulmonary edema cannot be excluded, however, given the prominent mediastinal lymph nodes, lymphangitic carcinomatosis cannot be excluded. Mild centrilobular emphysema is noted. No pulmonary parenchymal nodule or mass is identified. Upper Abdomen: No acute abnormality is noted. Soft Tissues/Bones: Minimal osteo-degenerative changes are noted involving the thoracic spine. 1.  No evidence of pulmonary embolism or acute pulmonary abnormality. 2.  Prominent mediastinal lymph nodes, as described above. Question inflammatory or neoplastic. PET/CT can be performed for further evaluation. 3.  Small bilateral pleural effusions. 4.  Mild cardiomegaly.  5. Interlobular septal thickening in the lower lobes. Given the mild cardiomegaly and pleural effusions, pulmonary edema cannot be excluded, however, given the prominent mediastinal lymph nodes, lymphangitic carcinomatosis cannot be excluded. 6. Mild centrilobular emphysema. Assessment and  plan:     Acute decompensated heart failure, pulmonary edema with bilateral pleural effusion  Mediastinal lymphadenopathy likely reactive due to inflammation or due to CHF or malignancy which is less likely but cannot rule out. Need follow-up CT chest in 4 to 8 weeks  Centrilobular emphysema secondary to smoking      He had chest shows no PE or acute pulmonary abnormality, prominent mediastinal lymph node small up to 1.2 cm likely reactive post inflammatory or due to CHF. Malignancy is less likely but cannot be ruled out and recommend follow-up CT chest in 4 to 8 weeks to see resolution of lymphadenopathy if persistent lymphadenopathy recommend PET/CT and possible biopsy. There is also centrilobular emphysematous changes likely due to smoking recommend smoking cessation. Recommend PFT as outpatient. Considering cardiomegaly elevated BNP bilateral pleural effusion likely acute decompensated CHF causing shortness of breath. Cardiology is following. We will follow    Thank you for consult  NOTE: This report was transcribed using voice recognition software. Every effort was made to ensure accuracy; however, inadvertent computerized transcription errors may be present.     Electronically signed by Victoria Harmon MD, FCCP on 1/1/2023 at 3:44 PM

## 2023-01-01 NOTE — PROGRESS NOTES
Hospitalist Progress Note      PCP: СЕРГЕЙ Monterroso CNP    Date of Admission: 12/31/2022    Subjective: :  Patient reports that his breathing feels 100 times better today. He had a better sleep last night. Medications:  Reviewed    Infusion Medications    sodium chloride       Scheduled Medications    furosemide  20 mg IntraVENous Daily    sodium chloride flush  5-40 mL IntraVENous 2 times per day    enoxaparin  40 mg SubCUTAneous Daily    nicotine  1 patch TransDERmal Daily     PRN Meds: sodium chloride flush, sodium chloride, ondansetron **OR** ondansetron, polyethylene glycol, acetaminophen **OR** acetaminophen, guaiFENesin      Intake/Output Summary (Last 24 hours) at 1/1/2023 0754  Last data filed at 12/31/2022 2217  Gross per 24 hour   Intake 250 ml   Output 1200 ml   Net -950 ml       Exam:    /82   Pulse (!) 108   Temp 98.1 °F (36.7 °C) (Oral)   Resp 18   Ht 5' 10\" (1.778 m)   Wt 209 lb (94.8 kg)   SpO2 91%   BMI 29.99 kg/m²     General appearance: Comfortable  HEENT: Conjunctivae/corneas clear. Neck: Supple, with full range of motion. Respiratory:  Normal respiratory effort. Clear to auscultation, bilaterally without Rales/Wheezes/Rhonchi. Cardiovascular: Regular rhythm, tachycardic, normal S1-2 without murmurs   Abdomen: Soft, non-tender, non-distended with normal bowel sounds. Musculoskeletal: No clubbing, cyanosis or edema bilaterally. Full range of motion without deformity. Skin: Skin color, texture, turgor normal.  No rashes or lesions.   Neuro: Alert, moving all extremities      Labs:   Recent Labs     12/31/22  0854 01/01/23  0532   WBC 7.7 11.2*   HGB 14.8 14.3   HCT 44.4 44.1    281     Recent Labs     12/31/22  0854 01/01/23  0532    138   K 4.2 4.1   * 103   CO2 21 23   BUN 16 15   CREATININE 0.97 1.16   CALCIUM 9.0 8.8     Recent Labs     01/01/23  0532   AST 13   ALT 20   BILITOT 0.8*   ALKPHOS 69     No results for input(s): INR in the last 72 hours. Recent Labs     12/31/22  0854   TROPONINI <0.010       Urinalysis:    No results found for: Elby Murphy, BACTERIA, RBCUA, BLOODU, [address], [address] [place] [address]    Radiology:  No orders to display           Assessment/Plan:    Active Hospital Problems    Diagnosis Date Noted    Respiratory failure ([facility] [address] [J96.90] 12/31/2022     Priority: Medium    Heart failure ([facility] [address] [I50.9] 12/31/2022     Priority: Medium     32-year-old male with a history of remote cocaine abuse who presented with shortness of breath. Shortness of breath  -BNP is elevated to 3470 and a CT chest showed small bilateral pleural effusions suggestive of heart failure. -He was diuresed yesterday. He feels much better. .  Continue IV Lasix 20 mg.  -Echo pending  -Cardiology consulted  -Procalcitonin negative  -Influenza and COVID-negative. Mediastinal lymphadenopathy  - likely reactive from recent infection. He is a smoker. Can Not rule out malignancy. Pulmonary consulted     Nicotine abuse-nicotine patch       Additional work up or/and treatment plan may be added today or then after based on clinical progression. I am managing a portion of pt care. Some medical issues are handled by other specialists. Additional work up and treatment should be done in out pt setting by pt PCP and other out pt providers. In addition to examining and evaluating pt, I spent additional time explaining care, normal and abnormal findings, and treatment plan. All of pt questions were answered. Counseling, diet and education were  provided. Case will be discussed with nursing staff when appropriate. Family will be updated if and when appropriate. Diet: ADULT DIET; Regular;  Low Sodium (2 gm)    Code Status: Full Code      Electronically signed by Samuel Muñiz MD on 1/1/2023 at 7:54 AM

## 2023-01-02 LAB
ANION GAP SERPL CALCULATED.3IONS-SCNC: 10 MEQ/L (ref 9–15)
BUN BLDV-MCNC: 20 MG/DL (ref 6–20)
CALCIUM SERPL-MCNC: 9.3 MG/DL (ref 8.5–9.9)
CHLORIDE BLD-SCNC: 101 MEQ/L (ref 95–107)
CO2: 31 MEQ/L (ref 20–31)
CREAT SERPL-MCNC: 1.2 MG/DL (ref 0.7–1.2)
GFR SERPL CREATININE-BSD FRML MDRD: >60 ML/MIN/{1.73_M2}
GLUCOSE BLD-MCNC: 94 MG/DL (ref 70–99)
LV EF: 13 %
LVEF MODALITY: NORMAL
POTASSIUM SERPL-SCNC: 4.1 MEQ/L (ref 3.4–4.9)
SODIUM BLD-SCNC: 142 MEQ/L (ref 135–144)

## 2023-01-02 PROCEDURE — 80048 BASIC METABOLIC PNL TOTAL CA: CPT

## 2023-01-02 PROCEDURE — 93306 TTE W/DOPPLER COMPLETE: CPT

## 2023-01-02 PROCEDURE — 99233 SBSQ HOSP IP/OBS HIGH 50: CPT | Performed by: INTERNAL MEDICINE

## 2023-01-02 PROCEDURE — 6370000000 HC RX 637 (ALT 250 FOR IP): Performed by: INTERNAL MEDICINE

## 2023-01-02 PROCEDURE — 6360000002 HC RX W HCPCS: Performed by: INTERNAL MEDICINE

## 2023-01-02 PROCEDURE — 2580000003 HC RX 258: Performed by: INTERNAL MEDICINE

## 2023-01-02 PROCEDURE — 36415 COLL VENOUS BLD VENIPUNCTURE: CPT

## 2023-01-02 PROCEDURE — 1210000000 HC MED SURG R&B

## 2023-01-02 RX ORDER — LOSARTAN POTASSIUM 25 MG/1
25 TABLET ORAL DAILY
Status: DISCONTINUED | OUTPATIENT
Start: 2023-01-02 | End: 2023-01-04

## 2023-01-02 RX ORDER — ACETAMINOPHEN 80 MG
TABLET,CHEWABLE ORAL ONCE
Status: COMPLETED | OUTPATIENT
Start: 2023-01-02 | End: 2023-01-02

## 2023-01-02 RX ADMIN — METOPROLOL TARTRATE 12.5 MG: 25 TABLET, FILM COATED ORAL at 12:03

## 2023-01-02 RX ADMIN — LOSARTAN POTASSIUM 25 MG: 25 TABLET, FILM COATED ORAL at 12:03

## 2023-01-02 RX ADMIN — Medication 10 ML: at 08:30

## 2023-01-02 RX ADMIN — Medication 10 ML: at 21:52

## 2023-01-02 RX ADMIN — Medication: at 21:52

## 2023-01-02 RX ADMIN — ENOXAPARIN SODIUM 40 MG: 100 INJECTION SUBCUTANEOUS at 08:28

## 2023-01-02 RX ADMIN — METOPROLOL TARTRATE 12.5 MG: 25 TABLET, FILM COATED ORAL at 21:51

## 2023-01-02 RX ADMIN — FUROSEMIDE 20 MG: 10 INJECTION, SOLUTION INTRAMUSCULAR; INTRAVENOUS at 18:57

## 2023-01-02 RX ADMIN — FUROSEMIDE 20 MG: 10 INJECTION, SOLUTION INTRAMUSCULAR; INTRAVENOUS at 08:28

## 2023-01-02 NOTE — PROGRESS NOTES
INPATIENT PROGRESS NOTES    PATIENT NAME: Paul Murray  MRN: [id number]  SERVICE DATE:  January 2, 2023   SERVICE TIME:  1:11 PM      PRIMARY SERVICE: Pulmonary Disease    CHIEF COMPLAIN: Shortness of breath      INTERVAL HPI: Patient seen and examined at bedside, Interval Notes, orders reviewed. Nursing notes noted  Patient is feeling much better. No complaint of shortness of breath at rest.  O2 saturation 100%. He has tachycardia heart rate was 132. He has no complaint of palpitation. No chest pain. No fever or chills. No cough or sputum production. No nausea vomiting diarrhea. His 2D echo shows left ventricular ejection fraction estimated to be 10 to 15% severe left ventricular systolic dysfunction. Left ventricular size increase moderately enlarged right ventricular cavity right ventricular global systolic function is severely reduced mitral regurgitation is present severe dilated left atrium. RVSP 33    OBJECTIVE    Body mass index is 29.99 kg/m². PHYSICAL EXAM:  Vitals:  BP (!) 126/93   Pulse (!) 132   Temp 98 °F (36.7 °C) (Oral)   Resp 20   Ht 5' 10\" (1.778 m)   Wt 209 lb (94.8 kg)   SpO2 100%   BMI 29.99 kg/m²   General: Alert, awake . comfortable in bed, No distress. Head: Atraumatic , Normocephalic   Eyes: PERRL. No sclera icterus. No conjunctival injection. No discharge   ENT: No nasal  discharge. Pharynx clear. Neck:  Trachea midline. No thyromegaly, no JVD, No cervical adenopathy. Chest : Bilaterally symmetrical ,Normal effort,  No accessory muscle use  Lung : . Fair BS bilateral, decreased BS at bases. No Rales. No wheezing. No rhonchi. Heart[de-identified] Normal  rate. Regular rhythm. No mumur ,  Rub or gallop  ABD: Non-tender. Non-distended. No masses. No organmegaly. Normal bowel sounds. No hernia.   Ext : No Pitting both leg , No Cyanosis No clubbing  Neuro: no focal weakness          DATA:   Recent Labs     12/31/22  0854 01/01/23  0532   WBC 7.7 11.2*   HGB 14.8 14.3   HCT 44.4 44.1 MCV 95.9* 97.5*    281     Recent Labs     01/01/23  0532 01/02/23  0959    142   K 4.1 4.1    101   CO2 23 31   BUN 15 20   CREATININE 1.16 1.20   GLUCOSE 145* 94   CALCIUM 8.8 9.3   PROT 5.5*  --    LABALBU 3.6  --    BILITOT 0.8*  --    ALKPHOS 69  --    AST 13  --    ALT 20  --    LABGLOM >60.0 >60.0   GLOB 1.9*  --        MV Settings:          No results for input(s): PHART, LNY9WCU, PO2ART, AGA0FDW, BEART, T2AEKUQJ in the last 72 hours. O2 Device: None (Room air)    ADULT DIET; Regular; Low Sodium (2 gm)     MEDICATIONS during current hospitalization:    Continuous Infusions:   sodium chloride         Scheduled Meds:   metoprolol tartrate  12.5 mg Oral BID    losartan  25 mg Oral Daily    pill splitter   Does not apply Once    furosemide  20 mg IntraVENous BID    sodium chloride flush  5-40 mL IntraVENous 2 times per day    enoxaparin  40 mg SubCUTAneous Daily    nicotine  1 patch TransDERmal Daily       PRN Meds:sodium chloride flush, sodium chloride, ondansetron **OR** ondansetron, polyethylene glycol, acetaminophen **OR** acetaminophen, guaiFENesin    Radiology  XR CHEST (2 VW)    Result Date: 12/31/2022  EXAMINATION: TWO XRAY VIEWS OF THE CHEST 12/31/2022 9:09 am COMPARISON: None. HISTORY: ORDERING SYSTEM PROVIDED HISTORY[facility] TECHN[facility] PROVIDED HISTORY: Reason for exam:->[facility] What reading provider will be dictating this exam?->CRC FINDINGS: The cardiac silhouette is within normals. Ground-glass airspace disease is noted. There is no focal consolidation. There is no pleural effusion. There is no pneumothorax. 1. Bilateral hazy ground-glass airspace disease which could represent atypical pneumonia or viral pneumonia.      CTA CHEST W WO CONTRAST PE Eval    Result Date: 12/31/2022  EXAMINATION: CTA OF THE CHEST WITH AND WITHOUT CONTRAST 12/31/2022 9:38 am TECHNIQUE: CTA of the chest was performed before and after the administration of intravenous contrast. Multiplanar reformatted images are provided for review. MIP images are provided for review. Automated exposure control, iterative reconstruction, and/or weight based adjustment of the mA/kV was utilized to reduce the radiation dose to as low as reasonably achievable. COMPARISON: Correlation is made with chest radiographs performed earlier in the day. HISTORY: ORDERING SYSTEM PROVIDED HISTORY: Shortness of breath and elevated D-dimer. TECHNOLOGIST PROVIDED HISTORY: Reason for exam:->Shortness of breath and elevated D-dimer. Decision Support Exception - unselect if not a suspected or confirmed emergency medical condition->Emergency Medical Condition (MA) What reading provider will be dictating this exam?->CRC FINDINGS: Pulmonary Arteries: Pulmonary arteries are adequately opacified for evaluation. No evidence of intraluminal filling defect to suggest pulmonary embolism. Main pulmonary artery is normal in caliber. Mediastinum: A multitude of small (up to 1.2 cm in transverse diameter) lymph nodes are identified throughout the mediastinum, including the superior, anterior, aortopulmonary window, and paratracheal spaces. There is ill-defined soft tissue density noted in the subcarinal region, suspicious for conglomeration of enlarged lymph nodes. Question inflammatory or neoplastic. PET/CT can be performed for further evaluation. No axillary or hilar lymphadenopathy is seen. The thoracic aorta is normal in caliber. Mild cardiomegaly is appreciated. The thyroid gland, esophagus, and trachea are unremarkable. Lungs/pleura: Small bilateral pleural effusions are noted. There is no evidence of acute consolidation or infiltrate. Interlobular septal thickening is identified in the lower lobes. Given the mild cardiomegaly and pleural effusions, the presence of mild pulmonary edema cannot be excluded, however, given the prominent mediastinal lymph nodes, lymphangitic carcinomatosis cannot be excluded.   Mild centrilobular emphysema is noted. No pulmonary parenchymal nodule or mass is identified. Upper Abdomen: No acute abnormality is noted. Soft Tissues/Bones: Minimal osteo-degenerative changes are noted involving the thoracic spine. 1.  No evidence of pulmonary embolism or acute pulmonary abnormality. 2.  Prominent mediastinal lymph nodes, as described above. Question inflammatory or neoplastic. PET/CT can be performed for further evaluation. 3.  Small bilateral pleural effusions. 4.  Mild cardiomegaly. 5. Interlobular septal thickening in the lower lobes. Given the mild cardiomegaly and pleural effusions, pulmonary edema cannot be excluded, however, given the prominent mediastinal lymph nodes, lymphangitic carcinomatosis cannot be excluded. 6.  Mild centrilobular emphysema. No orders to display       IMPRESSION AND SUGGESTION:  Acute decompensated heart failure, pulmonary edema with bilateral pleural effusion  Severe cardiomyopathy with a EF 10 to 15% with LV and RV dilatation. Mediastinal lymphadenopathy likely reactive due to inflammation or due to CHF or malignancy which is less likely but cannot rule out. Need follow-up CT chest in 4 to 8 weeks  Centrilobular emphysema secondary to smoking    Continue diuretic therapy. Cardiology is following. Patient has 2D echo showing severe LV dysfunction with EF 10 to 15% with dilated LV and RV. Patient's likely has decompensated heart failure with acute pulmonary edema and bilateral pleural effusion. Treatment plan as per cardiology. Mediastinal adenopathy likely reactive but recommend to have a follow-up CT chest done. Also has some changes of centrilobular emphysema and recommend to have a PFT done as outpatient. We will follow    I spent more than 35 min with this patient's care , greater the 50% of this time was spent in counseling and/or coordinating of care. NOTE: This report was transcribed using voice recognition software.  Every effort was made to ensure accuracy; however, inadvertent computerized transcription errors may be present.       Electronically signed by Estefani Durand MD, FCCP on 1/2/2023 at 1:11 PM

## 2023-01-02 NOTE — PROGRESS NOTES
Cardiology progress note    Patient Name: Otis Trujillo Date: 2022  2:08 PM  MR #: [id number]  : 1980    Attending Physician: Dexter Roblero MD  Reason for consult: Heart failure management    History of Presenting Illness:    Pratibha Bryan is a 40 y.o. male on hospital day 2 with a history of tobacco abuse admitted to the hospital for shortness of breath  Cardiology consulted for the management of heart failure    BNP 3400  Troponins negative  CT chest negative for PE but positive for bilateral pleural effusions    History Obtained From:  patient, electronic medical record  ============  Hospital course  2023  Patient laying in bed looks comfortable  Denies chest pain  Reports that shortness of breath has significantly improved  History:      Past Medical History:   Diagnosis Date    Centrilobular emphysema ([address] 2023    Lumbar radiculopathy 2018     Past Surgical History:   Procedure Laterality Date    CYST REMOVAL      neck/back of head    OTHER SURGICAL HISTORY  13    pilonidol cyst    OTHER SURGICAL HISTORY  3/4/13    Repair of scalp wound dehiscence    OTHER SURGICAL HISTORY  3/4/13    exploration of scalp wound with ligation of bleeder    WISDOM TOOTH EXTRACTION       Family History  Family History   Problem Relation Age of Onset    Diabetes Father     Cancer Neg Hx     Breast Cancer Neg Hx     Colon Cancer Neg Hx     Prostate Cancer Neg Hx     High Cholesterol Neg Hx     High Blood Pressure Neg Hx      Social History     Socioeconomic History    Marital status: Single     Spouse name: Not on file    Number of children: Not on file    Years of education: Not on file    Highest education level: Not on file   Occupational History    Not on file   Tobacco Use    Smoking status: Every Day     Packs/day: 1.00     Years: 18.00     Pack years: 18.00     Types: Cigarettes    Smokeless tobacco: Never   Substance and Sexual Activity    Alcohol use: Yes    Drug use:  Yes Types: Marijuana Jacqueline Hassan)    Sexual activity: Not Currently   Other Topics Concern    Not on file   Social History Narrative    Not on file     Social Determinants of Health     Financial Resource Strain: Low Risk     Difficulty of Paying Living Expenses: Not hard at all   Food Insecurity: No Food Insecurity    Worried About Running Out of Food in the Last Year: Never true    Ran Out of Food in the Last Year: Never true   Transportation Needs: Not on file   Physical Activity: Not on file   Stress: Not on file   Social Connections: Not on file   Intimate Partner Violence: Not on file   Housing Stability: Not on file     Home Medications:      Medications Prior to Admission: varenicline (CHANTIX STARTING MONTH CLEMENT) 0.5 MG X 11 & 1 MG X 42 tablet, Take by mouth. (Patient not taking: Reported on 12/31/2022)  varenicline (CHANTIX CONTINUING MONTH CLEMENT) 1 MG tablet, Take 1 tablet by mouth 2 times daily (Patient not taking: Reported on 12/31/2022)    Current Hospital Medications:   Scheduled Meds:   furosemide  20 mg IntraVENous BID    sodium chloride flush  5-40 mL IntraVENous 2 times per day    enoxaparin  40 mg SubCUTAneous Daily    nicotine  1 patch TransDERmal Daily     Continuous Infusions:   sodium chloride       PRN Meds:.sodium chloride flush, sodium chloride, ondansetron **OR** ondansetron, polyethylene glycol, acetaminophen **OR** acetaminophen, guaiFENesin   sodium chloride        Allergies: Allergies   Allergen Reactions    Vicodin [Hydrocodone-Acetaminophen] Nausea Only and Other (See Comments)     HEADACHES    Codeine Nausea And Vomiting      Review of Systems:       [x] CV, Resp, Neuro, , and all other systems reviewed and negative other than listed in HPI.      Objective Findings:     Vitals:   Vitals:    01/01/23 0800 01/01/23 1235 01/01/23 1553 01/01/23 2003   BP:  106/73 107/78 106/81   Pulse: (!) 107 (!) 110 51 (!) 116   Resp:  18  18   Temp:  98.3 °F (36.8 °C) 98.2 °F (36.8 °C) 98 °F (36.7 °C) TempSrc:  Oral Oral Oral   SpO2:  98% 97% 98%   Weight:       Height:            Physical Examination:  General: Alert oriented x4 not acute distress  HEENT: Normocephalic, no scleral icterus. Neck: No JVD. Heart: Regular, no murmur, no rub/gallop. No RV heave. Lungs: Clear to ascultation, no rales/wheezing/rhonchi. Good chest wall excursion. Abdomen: Soft nontender nondistended  Extremities: No clubbing/cyanosis, no edema. Skin: Warm, dry, normal turgor, no rash, no bruise, no petichiae. Neuro: No myoclonus or tremor. Psych: Normal affect    Results/ Medications reviewed 1/2/2023, 9:48 AM     Laboratory, Microbiology, Pathology, Radiology, Cardiology, Medications and Transcriptions reviewed  Scheduled Meds:   furosemide  20 mg IntraVENous BID    sodium chloride flush  5-40 mL IntraVENous 2 times per day    enoxaparin  40 mg SubCUTAneous Daily    nicotine  1 patch TransDERmal Daily     Continuous Infusions:   sodium chloride         Recent Labs     12/31/22  0854 01/01/23  0532   WBC 7.7 11.2*   HGB 14.8 14.3   HCT 44.4 44.1   MCV 95.9* 97.5*    281       Recent Labs     12/31/22  0854 01/01/23  0532    138   K 4.2 4.1   * 103   CO2 21 23   BUN 16 15   CREATININE 0.97 1.16       Recent Labs     01/01/23  0532   PROT 5.5*     No results found for this or any previous visit. Impression:   Acute decompensated heart failure with unknown ejection fraction  History of tobacco abuse  Plan:   Continue telemetry  Continue Lasix 20 mg IV twice daily and uptitrate as needed  Strict ins and outs and daily weights  Please keep potassium between 4 and 5 magnesium above 2  Please keep hemoglobin above 8  Obtain an echocardiogram  Start metoprolol 12.5 mg p.o. twice daily  Start losartan 25 mg daily  Comments: Thank you for allowing us to participate in the care of this patient. Will continue to follow. Please call if questions or concerns arise.     Electronically signed by Jayesh Ulrich MD on 1/2/2023 at 9:48 AM    Please note this report has been partially produced using speech recognition software and may cause contain errors related to that system including grammar, punctuation and spelling as well as words and phrases that may seem inappropriate. If there are questions or concerns please feel free to contact me to clarify.

## 2023-01-02 NOTE — FLOWSHEET NOTE
Pt had 14 beats of v tach resting in bed no complaints no chest pain. Perfected served Nina, advised to pass along to day shift for cardio. 1399 pt had 16 beats of v tach. Resting in bed.

## 2023-01-02 NOTE — CARE COORDINATION
[facility] [place] Case Management Initial Discharge Assessment    Met with Patient to discuss discharge plan. PCP: Clara Kamara, APRN - [facility]                                Date of Last Visit: 3-4 MONTHS AGO    [facility] Patient: No        [facility] Notified: no    If no PCP, list provided? N/A    Discharge Planning    Living Arrangements: independently at home    Who do you live with? SPOUSE     Who helps you with your care:  self    If lives at home:     Do you have any barriers navigating in your home? yes - 3 STEPS     Patient can perform ADL? Yes    Current Services (outpatient and in home) :  None    Dialysis: No    Is transportation available to get to your appointments? Yes    DME Equipment:  no    Respiratory equipment: None    Respiratory provider:  no     Pharmacy:  yes - [address] with Medication Assistance Program?  No      Patient agreeable to [facility]? Declined    Patient agreeable to SNF/Rehab? Declined    Other discharge needs identified? Cardiac Rehab    Does Patient Have a High-Risk for Readmission Diagnosis (CHF, PN, MI, COPD)? Yes, see care coordinator assessment    If Yes,    Consult with pulmonologist? N/A  Consult with cardiologist? Yes  Cardiac Rehab referral if EF <35%? N/A  Consult with Pharmacy for medication assessment prior to discharge? Yes  Consult with Behavioral health to aid in depression, anxiety, or coping issues? N/A  Palliative Care Consult? N/A  Pulmonary Rehab order for COPD, PN, and CHF (if EF > 35%)? N/A   Does patient have a reliable scale and know how to read it (for CHF)? Yes  Nutrition consult for CHF? Yes  Respiratory therapy consult that includes bedside instruction on administration of nebulizers and/or inhalers, and assessment of oxygen and equipment needs in the home? No    Initial Discharge Plan? MET W/PT TO ASSESS NEEDS AND DISCUSS DISCHARGE PLAN. PT DENIES NEEDS AT THIS TIME.  PT HAS A SCALE AND IS ABLE TO EASILY READ IT. PT'S DAD ALSO HAS HF AND IS FAMILIAR WITH THE SYMPTOMS AND RISK FACTORS. (Note: please see concurrent daily documentation for any updates after initial note).         Readmission Risk              Risk of Unplanned Readmission:  9         Electronically signed by Percy Murray RN on 1/2/2023 at 2:57 PM

## 2023-01-02 NOTE — CARE COORDINATION
Definition of CHF discussed with patient. Symptoms of heart failure and decompensation reviewed: weight gain of >3 #, edema, difficulty breathing, cough, issues with appetite, fatigue, or difficulty with sleep. Common causes of CHF reviewed: CAD, arrhythmias, MI, HTN, valve dz., infection,  ETOH or drug abuse, or genetic abnormalities. Importance of daily weight and B/P monitoring discussed. Pt to use a calender or notebook to record daily weight and call physician immediately with 3 # weight gain. Low sodium diet and fluid intake discussed. Pt taught about a fluid restriction and advised to discuss this with the cardiologist prior to limiting oral intake. Shown how to read labels for sodium levels, recommended food list provided. I emphasized the importance of following their physician's orders for medication administration. Importance of flu and pneumonia vaccinations reinforced. Common CHF medications reviewed as well as avoiding certain other meds (decongestants, NSAIDS)  Instructed to discuss activity recommendations with physician. Pt. currently smoking. Smoking cessation encouraged. Sample CHF weight documentation form provided. CHF Zones discussed. Importance of staying in \"green\" area stressed. Pt verbalized understanding to call MD ASAP when he reaches the yellow zone, and to call 911 when reaching the red zone. Booklet and zone pamphlet provided to the pt. Patient denies any further questions at this time.    Electronically signed by Ron Vasquez RN on 1/2/2023 at 1:50 PM

## 2023-01-03 ENCOUNTER — APPOINTMENT (OUTPATIENT)
Dept: CARDIAC CATH/INVASIVE PROCEDURES | Age: 43
DRG: 287 | End: 2023-01-03
Attending: INTERNAL MEDICINE
Payer: COMMERCIAL

## 2023-01-03 LAB
ANION GAP SERPL CALCULATED.3IONS-SCNC: 13 MEQ/L (ref 9–15)
ANION GAP SERPL CALCULATED.3IONS-SCNC: 13 MEQ/L (ref 9–15)
BUN BLDV-MCNC: 20 MG/DL (ref 6–20)
BUN BLDV-MCNC: 22 MG/DL (ref 6–20)
CALCIUM SERPL-MCNC: 8.6 MG/DL (ref 8.5–9.9)
CALCIUM SERPL-MCNC: 8.7 MG/DL (ref 8.5–9.9)
CHLORIDE BLD-SCNC: 100 MEQ/L (ref 95–107)
CHLORIDE BLD-SCNC: 101 MEQ/L (ref 95–107)
CO2: 27 MEQ/L (ref 20–31)
CO2: 27 MEQ/L (ref 20–31)
CREAT SERPL-MCNC: 1.27 MG/DL (ref 0.7–1.2)
CREAT SERPL-MCNC: 1.28 MG/DL (ref 0.7–1.2)
GFR SERPL CREATININE-BSD FRML MDRD: >60 ML/MIN/{1.73_M2}
GFR SERPL CREATININE-BSD FRML MDRD: >60 ML/MIN/{1.73_M2}
GLUCOSE BLD-MCNC: 120 MG/DL (ref 70–99)
GLUCOSE BLD-MCNC: 96 MG/DL (ref 70–99)
HCT VFR BLD CALC: 47.5 % (ref 42–52)
HEMOGLOBIN: 15.6 G/DL (ref 14–18)
LV EF: 20 %
LVEF MODALITY: NORMAL
MAGNESIUM: 1.9 MG/DL (ref 1.7–2.4)
MCH RBC QN AUTO: 32.2 PG (ref 27–31.3)
MCHC RBC AUTO-ENTMCNC: 32.8 % (ref 33–37)
MCV RBC AUTO: 98.1 FL (ref 79–92.2)
PDW BLD-RTO: 14.1 % (ref 11.5–14.5)
PLATELET # BLD: 291 K/UL (ref 130–400)
POTASSIUM SERPL-SCNC: 3.6 MEQ/L (ref 3.4–4.9)
POTASSIUM SERPL-SCNC: 4.3 MEQ/L (ref 3.4–4.9)
RBC # BLD: 4.85 M/UL (ref 4.7–6.1)
SODIUM BLD-SCNC: 140 MEQ/L (ref 135–144)
SODIUM BLD-SCNC: 141 MEQ/L (ref 135–144)
WBC # BLD: 10 K/UL (ref 4.8–10.8)

## 2023-01-03 PROCEDURE — 6370000000 HC RX 637 (ALT 250 FOR IP): Performed by: INTERNAL MEDICINE

## 2023-01-03 PROCEDURE — 6360000004 HC RX CONTRAST MEDICATION: Performed by: INTERNAL MEDICINE

## 2023-01-03 PROCEDURE — 2709999900 HC NON-CHARGEABLE SUPPLY

## 2023-01-03 PROCEDURE — 2580000003 HC RX 258: Performed by: INTERNAL MEDICINE

## 2023-01-03 PROCEDURE — 1210000000 HC MED SURG R&B

## 2023-01-03 PROCEDURE — 83735 ASSAY OF MAGNESIUM: CPT

## 2023-01-03 PROCEDURE — B2111ZZ FLUOROSCOPY OF MULTIPLE CORONARY ARTERIES USING LOW OSMOLAR CONTRAST: ICD-10-PCS | Performed by: INTERNAL MEDICINE

## 2023-01-03 PROCEDURE — 2500000003 HC RX 250 WO HCPCS

## 2023-01-03 PROCEDURE — 99232 SBSQ HOSP IP/OBS MODERATE 35: CPT | Performed by: INTERNAL MEDICINE

## 2023-01-03 PROCEDURE — 93458 L HRT ARTERY/VENTRICLE ANGIO: CPT

## 2023-01-03 PROCEDURE — 4A023N7 MEASUREMENT OF CARDIAC SAMPLING AND PRESSURE, LEFT HEART, PERCUTANEOUS APPROACH: ICD-10-PCS | Performed by: INTERNAL MEDICINE

## 2023-01-03 PROCEDURE — C1894 INTRO/SHEATH, NON-LASER: HCPCS

## 2023-01-03 PROCEDURE — 6360000002 HC RX W HCPCS: Performed by: INTERNAL MEDICINE

## 2023-01-03 PROCEDURE — 80048 BASIC METABOLIC PNL TOTAL CA: CPT

## 2023-01-03 PROCEDURE — B2151ZZ FLUOROSCOPY OF LEFT HEART USING LOW OSMOLAR CONTRAST: ICD-10-PCS | Performed by: INTERNAL MEDICINE

## 2023-01-03 PROCEDURE — 93458 L HRT ARTERY/VENTRICLE ANGIO: CPT | Performed by: INTERNAL MEDICINE

## 2023-01-03 PROCEDURE — 36415 COLL VENOUS BLD VENIPUNCTURE: CPT

## 2023-01-03 PROCEDURE — C1769 GUIDE WIRE: HCPCS

## 2023-01-03 PROCEDURE — 85027 COMPLETE CBC AUTOMATED: CPT

## 2023-01-03 PROCEDURE — 6360000002 HC RX W HCPCS

## 2023-01-03 RX ORDER — SODIUM CHLORIDE 9 MG/ML
INJECTION, SOLUTION INTRAVENOUS PRN
Status: DISCONTINUED | OUTPATIENT
Start: 2023-01-03 | End: 2023-01-04 | Stop reason: HOSPADM

## 2023-01-03 RX ORDER — MORPHINE SULFATE 2 MG/ML
2 INJECTION, SOLUTION INTRAMUSCULAR; INTRAVENOUS
Status: ACTIVE | OUTPATIENT
Start: 2023-01-03 | End: 2023-01-04

## 2023-01-03 RX ORDER — MIDAZOLAM HYDROCHLORIDE 1 MG/ML
2 INJECTION INTRAMUSCULAR; INTRAVENOUS
Status: ACTIVE | OUTPATIENT
Start: 2023-01-03 | End: 2023-01-04

## 2023-01-03 RX ORDER — ACETAMINOPHEN 325 MG/1
650 TABLET ORAL EVERY 4 HOURS PRN
Status: DISCONTINUED | OUTPATIENT
Start: 2023-01-03 | End: 2023-01-04 | Stop reason: HOSPADM

## 2023-01-03 RX ORDER — SODIUM CHLORIDE 9 MG/ML
INJECTION, SOLUTION INTRAVENOUS CONTINUOUS
Status: DISCONTINUED | OUTPATIENT
Start: 2023-01-03 | End: 2023-01-04 | Stop reason: HOSPADM

## 2023-01-03 RX ORDER — SODIUM CHLORIDE 0.9 % (FLUSH) 0.9 %
5-40 SYRINGE (ML) INJECTION PRN
Status: DISCONTINUED | OUTPATIENT
Start: 2023-01-03 | End: 2023-01-04 | Stop reason: HOSPADM

## 2023-01-03 RX ORDER — SODIUM CHLORIDE 9 MG/ML
INJECTION, SOLUTION INTRAVENOUS PRN
Status: DISCONTINUED | OUTPATIENT
Start: 2023-01-03 | End: 2023-01-04

## 2023-01-03 RX ORDER — ONDANSETRON 2 MG/ML
4 INJECTION INTRAMUSCULAR; INTRAVENOUS EVERY 6 HOURS PRN
Status: DISCONTINUED | OUTPATIENT
Start: 2023-01-03 | End: 2023-01-03 | Stop reason: SDUPTHER

## 2023-01-03 RX ORDER — SODIUM CHLORIDE 0.9 % (FLUSH) 0.9 %
5-40 SYRINGE (ML) INJECTION PRN
Status: DISCONTINUED | OUTPATIENT
Start: 2023-01-03 | End: 2023-01-04

## 2023-01-03 RX ORDER — LABETALOL HYDROCHLORIDE 5 MG/ML
10 INJECTION, SOLUTION INTRAVENOUS EVERY 30 MIN PRN
Status: DISCONTINUED | OUTPATIENT
Start: 2023-01-03 | End: 2023-01-04 | Stop reason: HOSPADM

## 2023-01-03 RX ORDER — FENTANYL CITRATE 50 UG/ML
25 INJECTION, SOLUTION INTRAMUSCULAR; INTRAVENOUS
Status: ACTIVE | OUTPATIENT
Start: 2023-01-03 | End: 2023-01-04

## 2023-01-03 RX ORDER — HYDRALAZINE HYDROCHLORIDE 20 MG/ML
10 INJECTION INTRAMUSCULAR; INTRAVENOUS EVERY 10 MIN PRN
Status: DISCONTINUED | OUTPATIENT
Start: 2023-01-03 | End: 2023-01-04 | Stop reason: HOSPADM

## 2023-01-03 RX ORDER — SODIUM CHLORIDE 0.9 % (FLUSH) 0.9 %
5-40 SYRINGE (ML) INJECTION EVERY 12 HOURS SCHEDULED
Status: DISCONTINUED | OUTPATIENT
Start: 2023-01-03 | End: 2023-01-04 | Stop reason: HOSPADM

## 2023-01-03 RX ADMIN — Medication 10 ML: at 20:44

## 2023-01-03 RX ADMIN — Medication 10 ML: at 09:00

## 2023-01-03 RX ADMIN — METOPROLOL TARTRATE 12.5 MG: 25 TABLET, FILM COATED ORAL at 09:17

## 2023-01-03 RX ADMIN — METOPROLOL TARTRATE 12.5 MG: 25 TABLET, FILM COATED ORAL at 20:44

## 2023-01-03 RX ADMIN — Medication 10 ML: at 12:49

## 2023-01-03 RX ADMIN — IOPAMIDOL 57 ML: 612 INJECTION, SOLUTION INTRAVENOUS at 12:49

## 2023-01-03 RX ADMIN — FUROSEMIDE 20 MG: 10 INJECTION, SOLUTION INTRAMUSCULAR; INTRAVENOUS at 17:14

## 2023-01-03 ASSESSMENT — PAIN DESCRIPTION - FREQUENCY
FREQUENCY: CONTINUOUS
FREQUENCY: OTHER (COMMENT)

## 2023-01-03 ASSESSMENT — PAIN DESCRIPTION - DESCRIPTORS
DESCRIPTORS: TIGHTNESS;PRESSURE
DESCRIPTORS: TIGHTNESS;PRESSURE

## 2023-01-03 ASSESSMENT — PAIN DESCRIPTION - ORIENTATION
ORIENTATION: MID;LOWER
ORIENTATION: MID;LOWER

## 2023-01-03 ASSESSMENT — PAIN DESCRIPTION - LOCATION
LOCATION: CHEST
LOCATION: CHEST

## 2023-01-03 ASSESSMENT — PAIN SCALES - GENERAL
PAINLEVEL_OUTOF10: 0
PAINLEVEL_OUTOF10: 0

## 2023-01-03 ASSESSMENT — PAIN DESCRIPTION - PAIN TYPE
TYPE: ACUTE PAIN
TYPE: ACUTE PAIN

## 2023-01-03 NOTE — PROGRESS NOTES
INPATIENT PROGRESS NOTES    PATIENT NAME: Vasile White  MRN: [id number]  SERVICE DATE:  January 3, 2023   SERVICE TIME:  8:35 AM      PRIMARY SERVICE: Pulmonary Disease    CHIEF COMPLAIN: Shortness of breath      INTERVAL HPI: Patient seen and examined at bedside, Interval Notes, orders reviewed. Nursing notes noted  Patient is feeling much better. No complaint of shortness of breath at rest.  O2 saturation 100%. No chest pain. No fever or chills. No cough or sputum production. No nausea vomiting diarrhea. He is going for cardiac cath today. 2D echo shows left ventricular ejection fraction estimated to be 10 to 15% severe left ventricular systolic dysfunction. Left ventricular size increase moderately enlarged right ventricular cavity right ventricular global systolic function is severely reduced mitral regurgitation is present severe dilated left atrium. RVSP 33    OBJECTIVE    Body mass index is 29.99 kg/m². PHYSICAL EXAM:  Vitals:  /82   Pulse (!) 108   Temp 97.2 °F (36.2 °C) (Oral)   Resp 18   Ht 5' 10\" (1.778 m)   Wt 209 lb (94.8 kg)   SpO2 100%   BMI 29.99 kg/m²   General: Alert, awake . comfortable in bed, No distress. Head: Atraumatic , Normocephalic   Eyes: PERRL. No sclera icterus. No conjunctival injection. No discharge   ENT: No nasal  discharge. Pharynx clear. Neck:  Trachea midline. No thyromegaly, no JVD, No cervical adenopathy. Chest : Bilaterally symmetrical ,Normal effort,  No accessory muscle use  Lung : . Fair BS bilateral, decreased BS at bases. No Rales. No wheezing. No rhonchi. Heart[de-identified] Normal  rate. Regular rhythm. No mumur ,  Rub or gallop  ABD: Non-tender. Non-distended. No masses. No organmegaly. Normal bowel sounds. No hernia.   Ext : No Pitting both leg , No Cyanosis No clubbing  Neuro: no focal weakness          DATA:   Recent Labs     01/01/23  0532 01/03/23  0019   WBC 11.2* 10.0   HGB 14.3 15.6   HCT 44.1 47.5   MCV 97.5* 98.1*    291     Recent Labs     01/01/23  0532 01/02/23  0959 01/03/23  0019 01/03/23  0432      < > 141 140   K 4.1   < > 4.3 3.6      < > 101 100   CO2 23   < > 27 27   BUN 15   < > 22* 20   CREATININE 1.16   < > 1.28* 1.27*   GLUCOSE 145*   < > 96 120*   CALCIUM 8.8   < > 8.7 8.6   PROT 5.5*  --   --   --    LABALBU 3.6  --   --   --    BILITOT 0.8*  --   --   --    ALKPHOS 69  --   --   --    AST 13  --   --   --    ALT 20  --   --   --    LABGLOM >60.0   < > >60.0 >60.0   GLOB 1.9*  --   --   --     < > = values in this interval not displayed. MV Settings:          No results for input(s): PHART, HLI0IJQ, PO2ART, MMF2ZDS, BEART, F5GNHPDO in the last 72 hours. O2 Device: None (Room air)    Diet NPO Exceptions are: Sips of Water with Meds     MEDICATIONS during current hospitalization:    Continuous Infusions:   sodium chloride      sodium chloride         Scheduled Meds:   sodium chloride flush  5-40 mL IntraVENous 2 times per day    metoprolol tartrate  12.5 mg Oral BID    losartan  25 mg Oral Daily    furosemide  20 mg IntraVENous BID    sodium chloride flush  5-40 mL IntraVENous 2 times per day    enoxaparin  40 mg SubCUTAneous Daily    nicotine  1 patch TransDERmal Daily       PRN Meds:sodium chloride flush, sodium chloride, sodium chloride flush, sodium chloride, ondansetron **OR** ondansetron, polyethylene glycol, acetaminophen **OR** acetaminophen, guaiFENesin    Radiology  XR CHEST (2 VW)    Result Date: 12/31/2022  EXAMINATION: TWO XRAY VIEWS OF THE CHEST 12/31/2022 9:09 am COMPARISON: None. HISTORY: ORDERING SYSTEM PROVIDED HISTORY: shortness TECHNOLOGIST PROVIDED HISTORY: Reason for exam:->shortness What reading provider will be dictating this exam?->CRC FINDINGS: The cardiac silhouette is within normals. Ground-glass airspace disease is noted. There is no focal consolidation. There is no pleural effusion. There is no pneumothorax.      1. Bilateral hazy ground-glass airspace disease which could represent atypical pneumonia or viral pneumonia. CTA CHEST W WO CONTRAST PE Eval    Result Date: 12/31/2022  EXAMINATION: CTA OF THE CHEST WITH AND WITHOUT CONTRAST 12/31/2022 9:38 am TECHNIQUE: CTA of the chest was performed before and after the administration of intravenous contrast.  Multiplanar reformatted images are provided for review. MIP images are provided for review. Automated exposure control, iterative reconstruction, and/or weight based adjustment of the mA/kV was utilized to reduce the radiation dose to as low as reasonably achievable. COMPARISON: Correlation is made with chest radiographs performed earlier in the day. HISTORY: ORDERING SYSTEM PROVIDED HISTORY: Shortness of breath and elevated D-dimer. TECHNOLOGIST PROVIDED HISTORY: Reason for exam:->Shortness of breath and elevated D-dimer. Decision Support Exception - unselect if not a suspected or confirmed emergency medical condition->Emergency Medical Condition (MA) What reading provider will be dictating this exam?->CRC FINDINGS: Pulmonary Arteries: Pulmonary arteries are adequately opacified for evaluation. No evidence of intraluminal filling defect to suggest pulmonary embolism. Main pulmonary artery is normal in caliber. Mediastinum: A multitude of small (up to 1.2 cm in transverse diameter) lymph nodes are identified throughout the mediastinum, including the superior, anterior, aortopulmonary window, and paratracheal spaces. There is ill-defined soft tissue density noted in the subcarinal region, suspicious for conglomeration of enlarged lymph nodes. Question inflammatory or neoplastic. PET/CT can be performed for further evaluation. No axillary or hilar lymphadenopathy is seen. The thoracic aorta is normal in caliber. Mild cardiomegaly is appreciated. The thyroid gland, esophagus, and trachea are unremarkable. Lungs/pleura: Small bilateral pleural effusions are noted. There is no evidence of acute consolidation or infiltrate. Interlobular septal thickening is identified in the lower lobes. Given the mild cardiomegaly and pleural effusions, the presence of mild pulmonary edema cannot be excluded, however, given the prominent mediastinal lymph nodes, lymphangitic carcinomatosis cannot be excluded. Mild centrilobular emphysema is noted. No pulmonary parenchymal nodule or mass is identified. Upper Abdomen: No acute abnormality is noted. Soft Tissues/Bones: Minimal osteo-degenerative changes are noted involving the thoracic spine. 1.  No evidence of pulmonary embolism or acute pulmonary abnormality. 2.  Prominent mediastinal lymph nodes, as described above. Question inflammatory or neoplastic. PET/CT can be performed for further evaluation. 3.  Small bilateral pleural effusions. 4.  Mild cardiomegaly. 5. Interlobular septal thickening in the lower lobes. Given the mild cardiomegaly and pleural effusions, pulmonary edema cannot be excluded, however, given the prominent mediastinal lymph nodes, lymphangitic carcinomatosis cannot be excluded. 6.  Mild centrilobular emphysema. No orders to display       IMPRESSION AND SUGGESTION:  Acute decompensated heart failure, pulmonary edema with bilateral pleural effusion, improved  Severe cardiomyopathy with a EF 10 to 15% with LV and RV dilatation. Mediastinal lymphadenopathy likely reactive due to inflammation or due to CHF or malignancy which is less likely but cannot rule out. Need follow-up CT chest in 4 to 8 weeks  Centrilobular emphysema secondary to smoking    Continue diuretic therapy. Going for cardiac cath today. 2D echo report noted. Treatment plan as per cardiology. Mediastinal adenopathy likely reactive but recommend to have a follow-up CT chest done. Also has some changes of centrilobular emphysema and recommend to have a PFT done as outpatient. We will follow    NOTE: This report was transcribed using voice recognition software.  Every effort was made to ensure accuracy; however, inadvertent computerized transcription errors may be present.       Electronically signed by Irina Guidry MD, FCCP on 1/3/2023 at 8:35 AM

## 2023-01-03 NOTE — PROGRESS NOTES
Hospitalist Progress Note      Date of Admission: 12/31/2022  Chief Complaint:    No chief complaint on file. Subjective:  No new complaints.   No nausea, vomiting, chest pain, or headache      Medications:    Infusion Medications    sodium chloride      sodium chloride 75 mL/hr at 01/03/23 1253    sodium chloride      sodium chloride       Scheduled Medications    sodium chloride flush  5-40 mL IntraVENous 2 times per day    sodium chloride flush  5-40 mL IntraVENous 2 times per day    metoprolol tartrate  12.5 mg Oral BID    losartan  25 mg Oral Daily    furosemide  20 mg IntraVENous BID    sodium chloride flush  5-40 mL IntraVENous 2 times per day    enoxaparin  40 mg SubCUTAneous Daily    nicotine  1 patch TransDERmal Daily     PRN Meds: sodium chloride flush, sodium chloride, sodium chloride flush, sodium chloride, acetaminophen, morphine, fentanNYL, midazolam, hydrALAZINE, labetalol, sodium chloride flush, sodium chloride, ondansetron **OR** ondansetron, polyethylene glycol, guaiFENesin    Intake/Output Summary (Last 24 hours) at 1/3/2023 1534  Last data filed at 1/3/2023 0159  Gross per 24 hour   Intake 442 ml   Output 1550 ml   Net -1108 ml       Exam:  /82   Pulse (!) 106   Temp 97.3 °F (36.3 °C) (Oral)   Resp 18   Ht 5' 10\" (1.778 m)   Wt 209 lb (94.8 kg)   SpO2 98%   BMI 29.99 kg/m²   Head: Normocephalic, atraumatic  Sclera clear  Neck JVD flat  Lungs: normal effort of breathing    Labs:   Recent Labs     01/01/23  0532 01/03/23  0019   WBC 11.2* 10.0   HGB 14.3 15.6   HCT 44.1 47.5    291       Recent Labs     01/01/23  0532 01/02/23  0959 01/03/23  0019 01/03/23  0432    142 141 140   K 4.1 4.1 4.3 3.6    101 101 100   CO2 23 31 27 27   BUN 15 20 22* 20   CREATININE 1.16 1.20 1.28* 1.27*   CALCIUM 8.8 9.3 8.7 8.6   AST 13  --   --   --    ALT 20  --   --   --    BILITOT 0.8*  --   --   --    ALKPHOS 69  --   --   --        No results for input(s): INR in the last 72 hours. No results for input(s): Sue Height in the last 72 hours. Radiology:  No orders to display     Assessment/Plan:    Chf: ef 10, for cath - non ischemic cardiomyopathy. Iv lasix, bb, entresto  Will defer aicd/chest vest to cards.      Acute hypoxic resp failure sec to chf    35 minutes total care time, >1/2 in unit/floor time and care coordination    Dc plan: no medical barriers to dc when cleared from cardiology perspective       Kim Crystal MD ,MD

## 2023-01-03 NOTE — PROGRESS NOTES
R wrist remains stable, no bleeding or hematoma. VSS Report given to Paige Gray RN.  Transport requested for pt to return to [place] [address].

## 2023-01-03 NOTE — PROGRESS NOTES
Hospitalist Progress Note      Date of Admission: 12/31/2022  Chief Complaint:    No chief complaint on file. Subjective:  No new complaints.   No nausea, vomiting, chest pain, or headache      Medications:    Infusion Medications    sodium chloride      sodium chloride 75 mL/hr at 01/03/23 1253    sodium chloride      sodium chloride       Scheduled Medications    sodium chloride flush  5-40 mL IntraVENous 2 times per day    sodium chloride flush  5-40 mL IntraVENous 2 times per day    metoprolol tartrate  12.5 mg Oral BID    losartan  25 mg Oral Daily    furosemide  20 mg IntraVENous BID    sodium chloride flush  5-40 mL IntraVENous 2 times per day    enoxaparin  40 mg SubCUTAneous Daily    nicotine  1 patch TransDERmal Daily     PRN Meds: sodium chloride flush, sodium chloride, sodium chloride flush, sodium chloride, acetaminophen, morphine, fentanNYL, midazolam, hydrALAZINE, labetalol, sodium chloride flush, sodium chloride, ondansetron **OR** ondansetron, polyethylene glycol, guaiFENesin    Intake/Output Summary (Last 24 hours) at 1/3/2023 1533  Last data filed at 1/3/2023 0159  Gross per 24 hour   Intake 442 ml   Output 1550 ml   Net -1108 ml     Exam:  /82   Pulse (!) 106   Temp 97.3 °F (36.3 °C) (Oral)   Resp 18   Ht 5' 10\" (1.778 m)   Wt 209 lb (94.8 kg)   SpO2 98%   BMI 29.99 kg/m²   Head: Normocephalic, atraumatic  Sclera clear  Neck JVD flat  Lungs: normal effort of breathing    Labs:   Recent Labs     01/01/23  0532 01/03/23  0019   WBC 11.2* 10.0   HGB 14.3 15.6   HCT 44.1 47.5    291     Recent Labs     01/01/23  0532 01/02/23  0959 01/03/23  0019 01/03/23  0432    142 141 140   K 4.1 4.1 4.3 3.6    101 101 100   CO2 23 31 27 27   BUN 15 20 22* 20   CREATININE 1.16 1.20 1.28* 1.27*   CALCIUM 8.8 9.3 8.7 8.6   AST 13  --   --   --    ALT 20  --   --   --    BILITOT 0.8*  --   --   --    ALKPHOS 69  --   --   --      No results for input(s): INR in the last 72 hours. No results for input(s): Felisa Lares in the last 72 hours. Radiology:  No orders to display     Assessment/Plan:    Chf: ef 10, for cath tomorrow.    Iv lasix, bb, entresto    Acute hypoxic resp failure sec to chf    35 minutes total care time, >1/2 in unit/floor time and care coordination        Suman Feldman MD ,MD

## 2023-01-03 NOTE — CARE COORDINATION
ORDER AND INFO FOR [facility] FAXED TO PETRA AT [facility]    1600  PER [address] [place] [address] AWAITING Seb [id number] FOR 17[address],

## 2023-01-03 NOTE — FLOWSHEET NOTE
Assessment completed. VS taken. Denies chest pain,sob or nausea at this take. Electronically signed by Geovanna Yadav RN on 1/3/2023 at 11:03 AM

## 2023-01-03 NOTE — BRIEF OP NOTE
Section of Cardiology  Adult Brief Cardiac Cath Procedure Note        Procedure(s):  LHC, b/l coronary angio, LV gram    Pre-operative Diagnosis: Severe cardiomyopathy    H&P Status: Completed and reviewed.      Post-operative Diagnosis: Nonobstructive coronary artery disease    Findings:  See full report  Right dominant system  Left main: Big size vessel normal  LAD: Big size vessel no disease  Circumflex: Big size vessel no disease  RCA: Big size vessel no disease  LVEDP 25 mmHg  No aortic valve gradient on catheter pullback  EF 20% by LV gram      Complications:  none    Recommendations:  Transfer patient back to holding area for post diagnostic cath management  Maximize medical therapy   Continue beta-blocker and Entresto  Remove TR band in 1 to 2 hours  Bedrest for 2 hours  Follow-up morning labs including CBC BMP      Primary Proceduralist:   Salvador Short MD    Full procedure note to follow

## 2023-01-03 NOTE — FLOWSHEET NOTE
Pt sustained 18 beat run of vtach then went back to sinus rhythm. Pt in bed asleep at time of vtach. Dr. Julietta Schwab notified via perfect serve at [address] [place] [address] Drive. Dr. Hardeep Quigley notified via perfect serve at [address] [place] [address] Drive.

## 2023-01-04 VITALS
RESPIRATION RATE: 18 BRPM | SYSTOLIC BLOOD PRESSURE: 100 MMHG | DIASTOLIC BLOOD PRESSURE: 83 MMHG | HEIGHT: 70 IN | HEART RATE: 110 BPM | TEMPERATURE: 98.1 F | OXYGEN SATURATION: 99 % | BODY MASS INDEX: 29.92 KG/M2 | WEIGHT: 209 LBS

## 2023-01-04 LAB
ANION GAP SERPL CALCULATED.3IONS-SCNC: 10 MEQ/L (ref 9–15)
BUN BLDV-MCNC: 20 MG/DL (ref 6–20)
CALCIUM SERPL-MCNC: 8.7 MG/DL (ref 8.5–9.9)
CHLORIDE BLD-SCNC: 101 MEQ/L (ref 95–107)
CO2: 28 MEQ/L (ref 20–31)
CREAT SERPL-MCNC: 1.22 MG/DL (ref 0.7–1.2)
GFR SERPL CREATININE-BSD FRML MDRD: >60 ML/MIN/{1.73_M2}
GLUCOSE BLD-MCNC: 110 MG/DL (ref 70–99)
POTASSIUM SERPL-SCNC: 3.9 MEQ/L (ref 3.4–4.9)
SODIUM BLD-SCNC: 139 MEQ/L (ref 135–144)

## 2023-01-04 PROCEDURE — 99238 HOSP IP/OBS DSCHRG MGMT 30/<: CPT | Performed by: INTERNAL MEDICINE

## 2023-01-04 PROCEDURE — 6370000000 HC RX 637 (ALT 250 FOR IP): Performed by: INTERNAL MEDICINE

## 2023-01-04 PROCEDURE — 80048 BASIC METABOLIC PNL TOTAL CA: CPT

## 2023-01-04 PROCEDURE — 36415 COLL VENOUS BLD VENIPUNCTURE: CPT

## 2023-01-04 PROCEDURE — 99232 SBSQ HOSP IP/OBS MODERATE 35: CPT | Performed by: INTERNAL MEDICINE

## 2023-01-04 PROCEDURE — 6360000002 HC RX W HCPCS: Performed by: INTERNAL MEDICINE

## 2023-01-04 RX ORDER — METOPROLOL SUCCINATE 25 MG/1
25 TABLET, EXTENDED RELEASE ORAL DAILY
Qty: 30 TABLET | Refills: 3 | Status: SHIPPED | OUTPATIENT
Start: 2023-01-04

## 2023-01-04 RX ORDER — METOPROLOL SUCCINATE 25 MG/1
25 TABLET, EXTENDED RELEASE ORAL DAILY
Status: DISCONTINUED | OUTPATIENT
Start: 2023-01-04 | End: 2023-01-04 | Stop reason: HOSPADM

## 2023-01-04 RX ADMIN — LOSARTAN POTASSIUM 25 MG: 25 TABLET, FILM COATED ORAL at 10:47

## 2023-01-04 RX ADMIN — METOPROLOL TARTRATE 12.5 MG: 25 TABLET, FILM COATED ORAL at 10:47

## 2023-01-04 RX ADMIN — FUROSEMIDE 20 MG: 10 INJECTION, SOLUTION INTRAMUSCULAR; INTRAVENOUS at 10:46

## 2023-01-04 RX ADMIN — ENOXAPARIN SODIUM 40 MG: 100 INJECTION SUBCUTANEOUS at 10:47

## 2023-01-04 NOTE — PROGRESS NOTES
INPATIENT PROGRESS NOTES    PATIENT NAME: Anne Mishra  MRN: [id number]  SERVICE DATE:  January 4, 2023   SERVICE TIME:  7:46 AM      PRIMARY SERVICE: Pulmonary Disease    CHIEF COMPLAIN: Shortness of breath      INTERVAL HPI: Patient seen and examined at bedside, Interval Notes, orders reviewed. Nursing notes noted  Patient is feeling much better. No complaint of shortness of breath at rest.  O2 saturation 98 %. He had cardiac cath yesterday and he has nonobstructive coronary artery disease LVEF was 20% no chest pain. No fever or chills. No cough or sputum production. No nausea vomiting diarrhea. OBJECTIVE    Body mass index is 29.99 kg/m². PHYSICAL EXAM:  Vitals:  /82   Pulse (!) 106   Temp 97.3 °F (36.3 °C) (Oral)   Resp 18   Ht 5' 10\" (1.778 m)   Wt 209 lb (94.8 kg)   SpO2 98%   BMI 29.99 kg/m²   General: Alert, awake . comfortable in bed, No distress. Head: Atraumatic , Normocephalic   Eyes: PERRL. No sclera icterus. No conjunctival injection. No discharge   ENT: No nasal  discharge. Pharynx clear. Neck:  Trachea midline. No thyromegaly, no JVD, No cervical adenopathy. Chest : Bilaterally symmetrical ,Normal effort,  No accessory muscle use  Lung : . Fair BS bilateral, decreased BS at bases. No Rales. No wheezing. No rhonchi. Heart[de-identified] Normal  rate. Regular rhythm. No mumur ,  Rub or gallop  ABD: Non-tender. Non-distended. No masses. No organmegaly. Normal bowel sounds. No hernia. Ext : No Pitting both leg , No Cyanosis No clubbing  Neuro: no focal weakness          DATA:   Recent Labs     01/03/23  0019   WBC 10.0   HGB 15.6   HCT 47.5   MCV 98.1*        Recent Labs     01/03/23  0432 01/04/23  0501    139   K 3.6 3.9    101   CO2 27 28   BUN 20 20   CREATININE 1.27* 1.22*   GLUCOSE 120* 110*   CALCIUM 8.6 8.7   LABGLOM >60.0 >60.0       MV Settings:          No results for input(s): PHART, YUD2DHL, PO2ART, GKD9EJF, BEART, F9FHRFQK in the last 72 hours.     O2 Device: None (Room air)    ADULT DIET; Regular; Low Fat/Low Chol/High Fiber/APARNA     MEDICATIONS during current hospitalization:    Continuous Infusions:   sodium chloride      sodium chloride      sodium chloride      sodium chloride         Scheduled Meds:   sodium chloride flush  5-40 mL IntraVENous 2 times per day    sodium chloride flush  5-40 mL IntraVENous 2 times per day    metoprolol tartrate  12.5 mg Oral BID    losartan  25 mg Oral Daily    furosemide  20 mg IntraVENous BID    sodium chloride flush  5-40 mL IntraVENous 2 times per day    enoxaparin  40 mg SubCUTAneous Daily    nicotine  1 patch TransDERmal Daily       PRN Meds:sodium chloride flush, sodium chloride, sodium chloride flush, sodium chloride, acetaminophen, morphine, fentanNYL, midazolam, hydrALAZINE, labetalol, sodium chloride flush, sodium chloride, ondansetron **OR** ondansetron, polyethylene glycol, guaiFENesin    Radiology  XR CHEST (2 VW)    Result Date: 12/31/2022  EXAMINATION: TWO XRAY VIEWS OF THE CHEST 12/31/2022 9:09 am COMPARISON: None. HISTORY: ORDERING SYSTEM PROVIDED HISTORY: [facility] TECHNOLOGIST PROVIDED HISTORY: Reason for exam:[facility] What reading provider will be dictating this exam?->CRC FINDINGS: The cardiac silhouette is within normals. Ground-glass airspace disease is noted. There is no focal consolidation. There is no pleural effusion. There is no pneumothorax. 1. Bilateral hazy ground-glass airspace disease which could represent atypical pneumonia or viral pneumonia. CTA CHEST W WO CONTRAST PE Eval    Result Date: 12/31/2022  EXAMINATION: CTA OF THE CHEST WITH AND WITHOUT CONTRAST 12/31/2022 9:38 am TECHNIQUE: CTA of the chest was performed before and after the administration of intravenous contrast.  Multiplanar reformatted images are provided for review. MIP images are provided for review.  Automated exposure control, iterative reconstruction, and/or weight based adjustment of the mA/kV was utilized to reduce the radiation dose to as low as reasonably achievable. COMPARISON: Correlation is made with chest radiographs performed earlier in the day. HISTORY: ORDERING SYSTEM PROVIDED HISTORY: Shortness of breath and elevated D-dimer. TECHNOLOGIST PROVIDED HISTORY: Reason for exam:->Shortness of breath and elevated D-dimer. Decision Support Exception - unselect if not a suspected or confirmed emergency medical condition->Emergency Medical Condition (MA) What reading provider will be dictating this exam?->CRC FINDINGS: Pulmonary Arteries: Pulmonary arteries are adequately opacified for evaluation. No evidence of intraluminal filling defect to suggest pulmonary embolism. Main pulmonary artery is normal in caliber. Mediastinum: A multitude of small (up to 1.2 cm in transverse diameter) lymph nodes are identified throughout the mediastinum, including the superior, anterior, aortopulmonary window, and paratracheal spaces. There is ill-defined soft tissue density noted in the subcarinal region, suspicious for conglomeration of enlarged lymph nodes. Question inflammatory or neoplastic. PET/CT can be performed for further evaluation. No axillary or hilar lymphadenopathy is seen. The thoracic aorta is normal in caliber. Mild cardiomegaly is appreciated. The thyroid gland, esophagus, and trachea are unremarkable. Lungs/pleura: Small bilateral pleural effusions are noted. There is no evidence of acute consolidation or infiltrate. Interlobular septal thickening is identified in the lower lobes. Given the mild cardiomegaly and pleural effusions, the presence of mild pulmonary edema cannot be excluded, however, given the prominent mediastinal lymph nodes, lymphangitic carcinomatosis cannot be excluded. Mild centrilobular emphysema is noted. No pulmonary parenchymal nodule or mass is identified. Upper Abdomen: No acute abnormality is noted.  Soft Tissues/Bones: Minimal osteo-degenerative changes are noted involving the thoracic spine. 1.  No evidence of pulmonary embolism or acute pulmonary abnormality. 2.  Prominent mediastinal lymph nodes, as described above. Question inflammatory or neoplastic. PET/CT can be performed for further evaluation. 3.  Small bilateral pleural effusions. 4.  Mild cardiomegaly. 5. Interlobular septal thickening in the lower lobes. Given the mild cardiomegaly and pleural effusions, pulmonary edema cannot be excluded, however, given the prominent mediastinal lymph nodes, lymphangitic carcinomatosis cannot be excluded. 6.  Mild centrilobular emphysema. No orders to display       IMPRESSION AND SUGGESTION:  Acute decompensated heart failure, pulmonary edema with bilateral pleural effusion, improved  Severe cardiomyopathy with a EF 20%  Mediastinal lymphadenopathy likely reactive due to inflammation or due to CHF or malignancy which is less likely but cannot rule out. Need follow-up CT chest in 4 to 8 weeks  Centrilobular emphysema secondary to smoking    Continue diuretic therapy. Going for cardiac cath today. 2D echo report noted. Treatment plan as per cardiology. Mediastinal adenopathy likely reactive but recommend to have a follow-up CT chest done. Also has some changes of centrilobular emphysema and recommend to have a PFT done as outpatient. Follow-up in office 2-week when discharged. NOTE: This report was transcribed using voice recognition software. Every effort was made to ensure accuracy; however, inadvertent computerized transcription errors may be present.       Electronically signed by Kurtis Burgos MD, FCCP on 1/4/2023 at 7:46 AM

## 2023-01-04 NOTE — CARE COORDINATION
Dc plan is home today once approval from [employer]. Awaiting Auth still per Michelle Davila at [place]

## 2023-01-04 NOTE — CARE COORDINATION
LIFE VEST DENIED PER RN.  MESSAGE SENT TO DR Xavi Smith TO CALL P2P [phone] [phone] REF # [id number] Patient ambulatory to triage without difficulty. Patient states the room has been spinning and she has had a productive cough with yellow sputum x3 days. Patient states she is a little SOB when she starts coughing a lot. Patient states she has \"sores in both of her nostrils\". Patient unsure if she has been running any fevers. Patient states she lost her voice. Denies sore throat.

## 2023-01-04 NOTE — FLOWSHEET NOTE
Discharge instructions reviewed with the pt. Pt has been denied for the life vest by his insurance at this time. Nurse stressed to the pt several times throughout the day how important it is to take his meds and be compliant with his care. Nurse also reviewed post cath care. Pt voiced understanding. Pt left the unit ambulatory with a male visitor.

## 2023-01-04 NOTE — PROGRESS NOTES
Cardiology progress note    Patient Name: Migel Yates Date: 2022  2:08 PM  MR #: [id number]  : 1980    Attending Physician: Narcisa Darnell MD  Reason for consult: Heart failure management    History of Presenting Illness:    Pretty Gomez is a 40 y.o. male on hospital day 4 with a history of tobacco abuse admitted to the hospital for shortness of breath  Cardiology consulted for the management of heart failure    BNP 3400  Troponins negative  CT chest negative for PE but positive for bilateral pleural effusions    History Obtained From:  patient, electronic medical record  ============  Hospital course  2023  Patient laying in bed looks comfortable  Denies chest pain  Yesterday patient underwent coronary angiogram which showed nonobstructive coronary artery disease    2023  Patient laying in bed looks comfortable  Denies chest pain  Reports that shortness of breath has significantly improved  History:      Past Medical History:   Diagnosis Date    Centrilobular emphysema ([address] 2023    Lumbar radiculopathy 2018     Past Surgical History:   Procedure Laterality Date    CYST REMOVAL      neck/back of head    OTHER SURGICAL HISTORY  13    pilonidol cyst    OTHER SURGICAL HISTORY  3/4/13    Repair of scalp wound dehiscence    OTHER SURGICAL HISTORY  3/4/13    exploration of scalp wound with ligation of bleeder    WISDOM TOOTH EXTRACTION       Family History  Family History   Problem Relation Age of Onset    Diabetes Father     Cancer Neg Hx     Breast Cancer Neg Hx     Colon Cancer Neg Hx     Prostate Cancer Neg Hx     High Cholesterol Neg Hx     High Blood Pressure Neg Hx      Social History     Socioeconomic History    Marital status: Single     Spouse name: Not on file    Number of children: Not on file    Years of education: Not on file    Highest education level: Not on file   Occupational History    Not on file   Tobacco Use    Smoking status: Every Day Packs/day: 1.00     Years: 18.00     Pack years: 18.00     Types: Cigarettes    Smokeless tobacco: Never   Substance and Sexual Activity    Alcohol use: Yes    Drug use: Yes     Types: Marijuana [place])    Sexual activity: Not Currently   Other Topics Concern    Not on file   Social History Narrative    Not on file     Social Determinants of Health     Financial Resource Strain: Low Risk     Difficulty of Paying Living Expenses: Not hard at all   Food Insecurity: No Food Insecurity    Worried About Running Out of Food in the Last Year: Never true    Ran Out of Food in the Last Year: Never true   Transportation Needs: Not on file   Physical Activity: Not on file   Stress: Not on file   Social Connections: Not on file   Intimate Partner Violence: Not on file   Housing Stability: Not on file     Home Medications:      Medications Prior to Admission: varenicline (CHANTIX STARTING MONTH CLEMENT) 0.5 MG X 11 & 1 MG X 42 tablet, Take by mouth. (Patient not taking: Reported on 12/31/2022)  varenicline (CHANTIX CONTINUING MONTH CLEMENT) 1 MG tablet, Take 1 tablet by mouth 2 times daily (Patient not taking: Reported on 12/31/2022)    Current Hospital Medications:   Scheduled Meds:   sodium chloride flush  5-40 mL IntraVENous 2 times per day    sodium chloride flush  5-40 mL IntraVENous 2 times per day    metoprolol tartrate  12.5 mg Oral BID    losartan  25 mg Oral Daily    furosemide  20 mg IntraVENous BID    sodium chloride flush  5-40 mL IntraVENous 2 times per day    enoxaparin  40 mg SubCUTAneous Daily    nicotine  1 patch TransDERmal Daily     Continuous Infusions:   sodium chloride      sodium chloride       PRN Meds:.sodium chloride flush, sodium chloride, acetaminophen, hydrALAZINE, labetalol, ondansetron **OR** ondansetron, polyethylene glycol, guaiFENesin   sodium chloride      sodium chloride        Allergies:      Allergies   Allergen Reactions    Vicodin [Hydrocodone-Acetaminophen] Nausea Only and Other (See Comments) HEADACHES    Codeine Nausea And Vomiting      Review of Systems:       [x] CV, Resp, Neuro, , and all other systems reviewed and negative other than listed in HPI. Objective Findings:     Vitals:   Vitals:    01/03/23 1200 01/03/23 1510 01/04/23 0750 01/04/23 1149   BP: 98/78 107/82 114/83 100/83   Pulse: (!) 102 (!) 106 66 (!) 110   Resp:  18     Temp:  97.3 °F (36.3 °C) 97.4 °F (36.3 °C) 98.1 °F (36.7 °C)   TempSrc:  Oral  Oral   SpO2: 98% 98% 100% 99%   Weight:       Height:            Physical Examination:  General: Alert oriented x4 not acute distress  HEENT: Normocephalic, no scleral icterus. Neck: No JVD. Heart: Regular, no murmur, no rub/gallop. No RV heave. Lungs: Clear to ascultation, no rales/wheezing/rhonchi. Good chest wall excursion. Abdomen: Soft nontender nondistended  Extremities: No clubbing/cyanosis, no edema. Skin: Warm, dry, normal turgor, no rash, no bruise, no petichiae. Neuro: No myoclonus or tremor. Psych: Normal affect    Results/ Medications reviewed 1/4/2023, 2:15 PM     Laboratory, Microbiology, Pathology, Radiology, Cardiology, Medications and Transcriptions reviewed  Scheduled Meds:   sodium chloride flush  5-40 mL IntraVENous 2 times per day    sodium chloride flush  5-40 mL IntraVENous 2 times per day    metoprolol tartrate  12.5 mg Oral BID    losartan  25 mg Oral Daily    furosemide  20 mg IntraVENous BID    sodium chloride flush  5-40 mL IntraVENous 2 times per day    enoxaparin  40 mg SubCUTAneous Daily    nicotine  1 patch TransDERmal Daily     Continuous Infusions:   sodium chloride      sodium chloride         Recent Labs     01/03/23  0019   WBC 10.0   HGB 15.6   HCT 47.5   MCV 98.1*        Recent Labs     01/03/23  0019 01/03/23  0432 01/04/23  0501    140 139   K 4.3 3.6 3.9    100 101   CO2 27 27 28   BUN 22* 20 20   CREATININE 1.28* 1.27* 1.22*     No results for input(s): PROT, INR in the last 72 hours. No results found for this or any previous visit. Impression:   Acute decompensated heart failure with unknown ejection fraction  History of tobacco abuse  TTE 1/2/2023 EF 15%  Coronary angiogram 1/3/2023 normal coronaries  Plan:   Continue telemetry  Hold lasix as patient looks euvolemic  Start Entresto  Start metoprolol succinate  Please keep potassium between 4 and 5 magnesium above 2  Please keep hemoglobin above 8  Will go home with a LifeVest.  Comments: Thank you for allowing us to participate in the care of this patient. Will continue to follow. Please call if questions or concerns arise. Electronically signed by Serenity Chow MD on 1/4/2023 at 2:15 PM    Please note this report has been partially produced using speech recognition software and may cause contain errors related to that system including grammar, punctuation and spelling as well as words and phrases that may seem inappropriate. If there are questions or concerns please feel free to contact me to clarify.

## 2023-01-04 NOTE — CARE COORDINATION
INSURANCE INFO GIVEN TO RN WHO WILL PROVIDE TO PATIENT TO CALL TO SEE IF IT WILL EXPEDITE THE LIFE VEST APPROVAL

## 2023-01-04 NOTE — PROGRESS NOTES
Physician Progress Note      Karissa Epstein  CSN #:                  [id number]  :                       1980  ADMIT DATE:       2022 2:08 PM  [address] [place] DATE:  RESPONDING  PROVIDER #:        George Tapia MD          QUERY TEXT:    Patient admitted with CHF. Noted documentation of acute respiratory failure in   in progress note dated . In order to support the diagnosis of acute   respiratory failure, please include additional clinical indicators in your   documentation. Or please document if the diagnosis of acute respiratory   failure has been ruled out after further study. The medical record reflects the following:  Risk Factors: acute CHF  Clinical Indicators: SPO2 % on RA, RR 18-20, denies SOB, no work of   breathing  Treatment:  Lasix 20mg IV BID, magnesium    Acute Respiratory Failure Clinical Indicators per [employer] [employer] Training Guide and   Quick Reference Guide:  pO2 < 60 mmHg or SpO2 (pulse oximetry) < 91% breathing room air  pCO2 > 50 and pH < 7.35  P/F ratio (pO2 / FIO2) < 300  pO2 decrease or pCO2 increase by 10 mmHg from baseline (if known)  Supplemental oxygen of 40% or more  Presence of respiratory distress, tachypnea, dyspnea, shortness of breath,   wheezing  Unable to speak in complete sentences  Use of accessory muscles to breathe  Extreme anxiety and feeling of impending doom  Tripod position  Confusion/altered mental status/obtunded  Options provided:  -- Acute Respiratory Failure as evidenced by, Please document evidence.   -- Acute Respiratory Failure ruled out after study  -- Other - I will add my own diagnosis  -- Disagree - Not applicable / Not valid  -- Disagree - Clinically unable to determine / Unknown  -- Refer to Clinical Documentation Reviewer    PROVIDER RESPONSE TEXT:    Data in chart does not support the definition of acute respiratory failure    Query created by: Larry Thacker on 2023 11:49 AM      Electronically signed by:  [employer] Reena Thomas MD 1/4/2023 12:25 PM

## 2023-01-05 ENCOUNTER — TELEPHONE (OUTPATIENT)
Dept: FAMILY MEDICINE CLINIC | Age: 43
End: 2023-01-05

## 2023-01-05 NOTE — TELEPHONE ENCOUNTER
Care Transitions Initial Follow Up Call    Outreach made within 2 business days of discharge: Yes    Patient: Cortez Hernandez Patient : 1980   MRN: [id number]  Reason for Admission: There are no discharge diagnoses documented for the most recent discharge. Discharge Date: 23       Spoke with: Pt    Discharge department/facility: W    TCM Interactive Patient Contact:  Was patient able to fill all prescriptions: Yes  Was patient instructed to bring all medications to the follow-up visit: Yes  Is patient taking all medications as directed in the discharge summary?  Yes  Does patient understand their discharge instructions: Yes  Does patient have questions or concerns that need addressed prior to 7-14 day follow up office visit: no    Scheduled appointment with PCP within 7-14 days    Follow Up  Future Appointments   Date Time Provider Chuck Bryson   2023  9:30 AM СЕРГЕЙ Dhillon - CNP [address]

## 2023-01-06 NOTE — DISCHARGE SUMMARY
Cardiology Discharge Summary      Patient Identification:  Juwan Eduardo  : 1980  MRN: [id number]   Account: [de-identified]     Admit date: 2022  Discharge date: 2023  Attending provider: No att. providers found        Primary care provider: СЕРГЕЙ Love CNP     Admission Diagnoses:  Respiratory failure [facility])         Discharge Diagnoses:    Active Hospital Problems    Diagnosis Date Noted    Mediastinal lymphadenopathy [R59.0] 2023     Priority: Medium    Shortness of breath [R06.02] 2023     Priority: Medium    Centrilobular emphysema ([address] [J43.2] 2023     Priority: Medium    Bilateral pleural effusion [J90] 2023     Priority: Medium    Tobacco abuse [Z72.0] 2023     Priority: Medium    Respiratory failure ([address] [J96.90] 2022     Priority: Medium    Heart failure ([address] [I50.9] 2022     Priority: Medium          Hospital Course:   Juwan Eduardo is a 40 y.o. male on hospital day 4 with a history of tobacco abuse admitted to the hospital for shortness of breath  Cardiology consulted for the management of heart failure    BNP 3400  Troponins negative  CT chest negative for PE but positive for bilateral pleural effusions  ==============  Patient was placed on cardiomyopathy medications including metoprolol succinate and Entresto  Subsequently on 1/3/2023 patient was brought to the Cath Lab for coronary angiogram where she was found with normal coronaries    There was a plan to send patient home with a LifeVest.  Unfortunately insurance denied LifeVest  There was a plan to do a one-to-one appeal.  However patient did not want to wait any longer in the hospital.  He stated that he felt well  Plan to see patient in clinic next week  Patient was referred to [facility] for ICD placement consideration      Procedures:   Coronary angiogram 1/3/2023  Normal coronaries     Consults: Electrophysiology    Examination:  /83   Pulse (!) 110   Temp 98.1 °F (36.7 °C) (Oral)   Resp 18   Ht 5' 10\" (1.778 m)   Wt 209 lb (94.8 kg)   SpO2 99%   BMI 29.99 kg/m²    Physical Exam  Vitals and nursing note reviewed. Constitutional:       Appearance: Normal appearance. HENT:      Head: Normocephalic and atraumatic. Mouth/Throat:      Mouth: Mucous membranes are moist.      Pharynx: Oropharynx is clear. Eyes:      Extraocular Movements: Extraocular movements intact. Conjunctiva/sclera: Conjunctivae normal.      Pupils: Pupils are equal, round, and reactive to light. Cardiovascular:      Rate and Rhythm: Normal rate and regular rhythm. Pulses: Normal pulses. Heart sounds: Normal heart sounds. Pulmonary:      Effort: Pulmonary effort is normal.      Breath sounds: Normal breath sounds. Abdominal:      General: Abdomen is flat. Bowel sounds are normal.      Palpations: Abdomen is soft. Musculoskeletal:         General: Normal range of motion. Cervical back: Normal range of motion and neck supple. Skin:     General: Skin is warm. Neurological:      General: No focal deficit present. Mental Status: He is alert and oriented to person, place, and time. Mental status is at baseline.    Psychiatric:         Mood and Affect: Mood normal.       Medications:  Discharge Medication List as of 1/4/2023  4:56 PM        START taking these medications    Details   metoprolol succinate (TOPROL XL) 25 MG extended release tablet Take 1 tablet by mouth daily, Disp-30 tablet, R-3Normal      sacubitril-valsartan (ENTRESTO) 24-26 MG per tablet Take 1 tablet by mouth 2 times daily, Disp-180 tablet, R-5Normal           STOP taking these medications       varenicline (CHANTIX STARTING MONTH CLEMENT) 0.5 MG X 11 & 1 MG X 42 tablet Comments:   Reason for Stopping:         varenicline (CHANTIX CONTINUING MONTH CLEMENT) 1 MG tablet Comments:   Reason for Stopping:               Significant Diagnostics:   Radiology: XR CHEST (2 VW)    Result Date: 12/31/2022  EXAMINATION: TWO XRAY VIEWS OF THE CHEST 12/31/2022 9:09 am COMPARISON: None. HISTORY: ORDERING SYSTEM PROVIDED HISTORY: shortness TECHNOLOGIST PROVIDED HISTORY: Reason for exam:->shortness What reading provider will be dictating this exam?->CRC FINDINGS: The cardiac silhouette is within normals. Ground-glass airspace disease is noted. There is no focal consolidation. There is no pleural effusion. There is no pneumothorax. 1. Bilateral hazy ground-glass airspace disease which could represent atypical pneumonia or viral pneumonia. CTA CHEST W WO CONTRAST PE Eval    Result Date: 12/31/2022  EXAMINATION: CTA OF THE CHEST WITH AND WITHOUT CONTRAST 12/31/2022 9:38 am TECHNIQUE: CTA of the chest was performed before and after the administration of intravenous contrast.  Multiplanar reformatted images are provided for review. MIP images are provided for review. Automated exposure control, iterative reconstruction, and/or weight based adjustment of the mA/kV was utilized to reduce the radiation dose to as low as reasonably achievable. COMPARISON: Correlation is made with chest radiographs performed earlier in the day. HISTORY: ORDERING SYSTEM PROVIDED HISTORY: Shortness of breath and elevated D-dimer. TECHNOLOGIST PROVIDED HISTORY: Reason for exam:->Shortness of breath and elevated D-dimer. Decision Support Exception - unselect if not a suspected or confirmed emergency medical condition->Emergency Medical Condition (MA) What reading provider will be dictating this exam?->CRC FINDINGS: Pulmonary Arteries: Pulmonary arteries are adequately opacified for evaluation. No evidence of intraluminal filling defect to suggest pulmonary embolism. Main pulmonary artery is normal in caliber.  Mediastinum: A multitude of small (up to 1.2 cm in transverse diameter) lymph nodes are identified throughout the mediastinum, including the superior, anterior, aortopulmonary window, and paratracheal spaces. There is ill-defined soft tissue density noted in the subcarinal region, suspicious for conglomeration of enlarged lymph nodes. Question inflammatory or neoplastic. PET/CT can be performed for further evaluation. No axillary or hilar lymphadenopathy is seen. The thoracic aorta is normal in caliber. Mild cardiomegaly is appreciated. The thyroid gland, esophagus, and trachea are unremarkable. Lungs/pleura: Small bilateral pleural effusions are noted. There is no evidence of acute consolidation or infiltrate. Interlobular septal thickening is identified in the lower lobes. Given the mild cardiomegaly and pleural effusions, the presence of mild pulmonary edema cannot be excluded, however, given the prominent mediastinal lymph nodes, lymphangitic carcinomatosis cannot be excluded. Mild centrilobular emphysema is noted. No pulmonary parenchymal nodule or mass is identified. Upper Abdomen: No acute abnormality is noted. Soft Tissues/Bones: Minimal osteo-degenerative changes are noted involving the thoracic spine. 1.  No evidence of pulmonary embolism or acute pulmonary abnormality. 2.  Prominent mediastinal lymph nodes, as described above. Question inflammatory or neoplastic. PET/CT can be performed for further evaluation. 3.  Small bilateral pleural effusions. 4.  Mild cardiomegaly. 5. Interlobular septal thickening in the lower lobes. Given the mild cardiomegaly and pleural effusions, pulmonary edema cannot be excluded, however, given the prominent mediastinal lymph nodes, lymphangitic carcinomatosis cannot be excluded. 6.  Mild centrilobular emphysema.        Labs:   Recent Results (from the past 72 hour(s))   Basic Metabolic Panel    Collection Time: 01/04/23  5:01 AM   Result Value Ref Range    Sodium 139 135 - 144 mEq/L    Potassium 3.9 3.4 - 4.9 mEq/L    Chloride 101 95 - 107 mEq/L    CO2 28 20 - 31 mEq/L    Anion Gap 10 9 - 15 mEq/L    Glucose 110 (H) 70 - 99 mg/dL    BUN 20 6 - 20 mg/dL    Creatinine 1.22 (H) 0.70 - 1.20 mg/dL    Est, Glom Filt Rate >60.0 >60    Calcium 8.7 8.5 - 9.9 mg/dL          normal EKG, normal sinus rhythm    Follow-up visits:   Victoria Harmon, [address] [facility] [address] [facility]    Follow up in 2 week(s)      Sidney Mendoza MD  [facility], [facility]  [phone]    Follow up on 1/9/2023  Hospital follow up, Schedule an appointment as soon as possible    Winsome Alonso MD  [id number]    Follow up in 1 week(s)  Hospital follow up       Assessment:  Active Hospital Problems    Diagnosis Date Noted    Mediastinal lymphadenopathy [R59.0] 01/01/2023     Priority: Medium    Shortness of breath [R06.02] 01/01/2023     Priority: Medium    Centrilobular emphysema ([address] [J43.2] 01/01/2023     Priority: Medium    Bilateral pleural effusion [J90] 01/01/2023     Priority: Medium    Tobacco abuse [Z72.0] 01/01/2023     Priority: Medium    Respiratory failure ([address] [J96.90] 12/31/2022     Priority: Medium    Heart failure ([address] [I50.9] 12/31/2022     Priority: Medium         Plan:   1.   Continue cardiomyopathy medications including metoprolol succinate and Entresto  Follow-up with me in clinic in 1 week  Follow-up with EP in 1 week for ICD placement consideration        Electronically signed by Sidney Mendoza, [address] [place] [address] 1/6/2023 at 10:58 AM

## 2023-01-09 ENCOUNTER — OFFICE VISIT (OUTPATIENT)
Dept: CARDIOLOGY CLINIC | Age: 43
End: 2023-01-09
Payer: COMMERCIAL

## 2023-01-09 VITALS
HEART RATE: 99 BPM | OXYGEN SATURATION: 95 % | DIASTOLIC BLOOD PRESSURE: 82 MMHG | WEIGHT: 214 LBS | BODY MASS INDEX: 30.64 KG/M2 | SYSTOLIC BLOOD PRESSURE: 124 MMHG | HEIGHT: 70 IN | RESPIRATION RATE: 14 BRPM

## 2023-01-09 DIAGNOSIS — I50.9 HEART FAILURE, UNSPECIFIED HF CHRONICITY, UNSPECIFIED HEART FAILURE TYPE (HCC): Primary | ICD-10-CM

## 2023-01-09 PROCEDURE — 99203 OFFICE O/P NEW LOW 30 MIN: CPT | Performed by: INTERNAL MEDICINE

## 2023-01-09 RX ORDER — ALBUTEROL SULFATE 90 UG/1
AEROSOL, METERED RESPIRATORY (INHALATION)
COMMUNITY
Start: 2022-12-04

## 2023-01-09 RX ORDER — FUROSEMIDE 20 MG/1
20 TABLET ORAL DAILY
Qty: 60 TABLET | Refills: 3 | Status: SHIPPED | OUTPATIENT
Start: 2023-01-09

## 2023-01-09 ASSESSMENT — ENCOUNTER SYMPTOMS
RHINORRHEA: 0
COUGH: 0
VOMITING: 0
APNEA: 0
COLOR CHANGE: 0
DIARRHEA: 0
WHEEZING: 0
SHORTNESS OF BREATH: 0
CHEST TIGHTNESS: 0
CONSTIPATION: 0
NAUSEA: 0
ABDOMINAL PAIN: 0
EYE REDNESS: 0

## 2023-01-09 NOTE — PROGRESS NOTES
Chief Complaint   Patient presents with    [place] [facility] [place]) for SOB and Heart Failure. Patient presents for initial medical evaluation. Patient is followed on a regular basis by Dr. Coco Gallardo, APRCARMELO - CNP. Newly found nonischemic cardiomyopathy EF 15% by TTE 1/2/2023  Alcohol abuse. Sober since January 2023  Tobacco abuse  Coronary angiogram 1/30/2023 normal coronaries  TTE 1/2/2023 EF 15%  ===============  1/9/2023  First clinic visit follow-up on recent hospitalization  At the end of last month patient was admitted to the hospital for newly found heart failure.   Patient was found with newly found nonischemic cardiomyopathy EF 15%  Patient underwent coronary angiogram which showed normal coronaries  Patient was placed on cardiomyopathy medications and was instructed to follow-up with PCP for possible ICD placement  Patient reports feeling well reports that he quit smoking and he is no longer drinking alcohol  At times he feels lightheaded with some of the medications      Patient Active Problem List   Diagnosis    Pilonidal cyst with abscess    Scalp cyst    Dehiscence of operative wound    Lumbar radiculopathy    Herniated lumbar intervertebral disc    Heart failure (HCC)    Respiratory failure (HCC)    Mediastinal lymphadenopathy    Shortness of breath    Centrilobular emphysema (HCC)    Bilateral pleural effusion    Tobacco abuse       Past Surgical History:   Procedure Laterality Date    CYST REMOVAL  2012    neck/back of head    OTHER SURGICAL HISTORY  1/17/13    pilonidol cyst    OTHER SURGICAL HISTORY  3/4/13    Repair of scalp wound dehiscence    OTHER SURGICAL HISTORY  3/4/13    exploration of scalp wound with ligation of bleeder    WISDOM TOOTH EXTRACTION         Social History     Socioeconomic History    Marital status: Single   Tobacco Use    Smoking status: Every Day     Packs/day: 1.00     Years: 18.00     Pack years: 18.00     Types: Cigarettes Smokeless tobacco: Never   Substance and Sexual Activity    Alcohol use: Yes    Drug use: Yes     Types: Marijuana Sanket Semen)    Sexual activity: Not Currently     Social Determinants of Health     Financial Resource Strain: Low Risk     Difficulty of Paying Living Expenses: Not hard at all   Food Insecurity: No Food Insecurity    Worried About Running Out of Food in the Last Year: Never true    Ran Out of Food in the Last Year: Never true       Family History   Problem Relation Age of Onset    Diabetes Father     Cancer Neg Hx     Breast Cancer Neg Hx     Colon Cancer Neg Hx     Prostate Cancer Neg Hx     High Cholesterol Neg Hx     High Blood Pressure Neg Hx        Current Outpatient Medications   Medication Sig Dispense Refill    albuterol sulfate HFA (PROVENTIL;VENTOLIN;PROAIR) 108 (90 Base) MCG/ACT inhaler inhale 2 puffs by mouth and INTO THE LUNGS every 4 to 6 hours      metoprolol succinate (TOPROL XL) 25 MG extended release tablet Take 1 tablet by mouth daily 30 tablet 3    sacubitril-valsartan (ENTRESTO) 24-26 MG per tablet Take 1 tablet by mouth 2 times daily 180 tablet 5     No current facility-administered medications for this visit. Vicodin [hydrocodone-acetaminophen] and Codeine    Review of Systems:  Review of Systems   Constitutional:  Negative for activity change, chills, diaphoresis and fever. HENT:  Negative for congestion, ear pain, nosebleeds and rhinorrhea. Eyes:  Negative for redness and visual disturbance. Respiratory:  Negative for apnea, cough, chest tightness, shortness of breath and wheezing. Cardiovascular:  Negative for chest pain, palpitations and leg swelling. Gastrointestinal:  Negative for abdominal pain, constipation, diarrhea, nausea and vomiting. Genitourinary:  Negative for difficulty urinating and dysuria. Musculoskeletal: Negative. Negative for joint swelling. Skin:  Negative for color change, rash and wound.    Neurological:  Negative for dizziness, syncope, weakness, numbness and headaches. Psychiatric/Behavioral: Negative. VITALS:  Blood pressure 124/82, pulse 99, resp. rate 14, height 5' 10\" (1.778 m), weight 214 lb (97.1 kg), SpO2 95 %. Body mass index is 30.71 kg/m². Physical Examination:  Physical Exam  Vitals and nursing note reviewed. Constitutional:       Appearance: Normal appearance. HENT:      Head: Normocephalic and atraumatic. Mouth/Throat:      Mouth: Mucous membranes are moist.      Pharynx: Oropharynx is clear. Eyes:      Extraocular Movements: Extraocular movements intact. Conjunctiva/sclera: Conjunctivae normal.      Pupils: Pupils are equal, round, and reactive to light. Cardiovascular:      Rate and Rhythm: Normal rate and regular rhythm. Pulses: Normal pulses. Heart sounds: Normal heart sounds. Pulmonary:      Effort: Pulmonary effort is normal.      Breath sounds: Normal breath sounds. Abdominal:      General: Abdomen is flat. Bowel sounds are normal.      Palpations: Abdomen is soft. Musculoskeletal:         General: Normal range of motion. Cervical back: Normal range of motion and neck supple. Skin:     General: Skin is warm. Neurological:      General: No focal deficit present. Mental Status: He is alert and oriented to person, place, and time. Mental status is at baseline. Psychiatric:         Mood and Affect: Mood normal.      EKG: normal sinus rhythm    No orders of the defined types were placed in this encounter. The ASCVD Risk score (Uzma WRIGHT, et al., 2019) failed to calculate for the following reasons:    Cannot find a previous HDL lab    Cannot find a previous total cholesterol lab     ASSESSMENT:     Diagnosis Orders   1.  Heart failure, unspecified HF chronicity, unspecified heart failure type ([facility] [address]          PLAN:   Newly found nonischemic cardiomyopathy EF 15% by TTE 1/2/2023  To my exam patient looks euvolemic  Continue metoprolol succinate 25 mg daily  Continue Entresto  I will prescribe Lasix 20 mg p.o. as needed, instructed patient to take this medication if he gains more than 5 pounds or he feels short of breath/lower extremity edema  Follow-up with EP for ICD consideration  Repeat echocardiogram in 3 months  Patient not stable to return to work yet  Refer patient to cardiac rehab    Alcohol abuse  Patient has been sober since recent hospitalization    Tobacco abuse  Patient quit at the beginning of the month      Recommended patient to eat diets that emphasize high intakes of vegetables, fruits, nuts, whole grains, lean vegetable or animal protein, and fish. As per 2019 ACC/AHA guideline on primary prevention of CVD     Recommended patient to engage in at least 150 minutes per week of accumulated moderate-intensity physical activity or 75 minutes per week of vigorous-intensity physical activity as per 2019 ACC/AHA guideline on primary prevention of CVD       This note was transcribed using voice recognition software. Every effort was made to ensure accuracy; however, inadvertent computerized transcription errors may be present.

## 2023-01-11 ENCOUNTER — OFFICE VISIT (OUTPATIENT)
Dept: FAMILY MEDICINE CLINIC | Age: 43
End: 2023-01-11

## 2023-01-11 VITALS
HEART RATE: 101 BPM | HEIGHT: 70 IN | SYSTOLIC BLOOD PRESSURE: 92 MMHG | OXYGEN SATURATION: 97 % | BODY MASS INDEX: 30.78 KG/M2 | DIASTOLIC BLOOD PRESSURE: 70 MMHG | WEIGHT: 215 LBS

## 2023-01-11 DIAGNOSIS — R59.0 MEDIASTINAL LYMPHADENOPATHY: Primary | ICD-10-CM

## 2023-01-11 DIAGNOSIS — J96.90 RESPIRATORY FAILURE, UNSPECIFIED CHRONICITY, UNSPECIFIED WHETHER WITH HYPOXIA OR HYPERCAPNIA (HCC): ICD-10-CM

## 2023-01-11 DIAGNOSIS — J43.2 CENTRILOBULAR EMPHYSEMA (HCC): ICD-10-CM

## 2023-01-11 DIAGNOSIS — R91.8 ABNORMAL CT SCAN, LUNG: ICD-10-CM

## 2023-01-11 DIAGNOSIS — Z09 HOSPITAL DISCHARGE FOLLOW-UP: ICD-10-CM

## 2023-01-11 DIAGNOSIS — I50.9 HEART FAILURE, UNSPECIFIED HF CHRONICITY, UNSPECIFIED HEART FAILURE TYPE (HCC): ICD-10-CM

## 2023-01-11 RX ORDER — ALBUTEROL SULFATE 90 UG/1
2 AEROSOL, METERED RESPIRATORY (INHALATION) 4 TIMES DAILY PRN
Qty: 54 G | Refills: 1 | Status: SHIPPED | OUTPATIENT
Start: 2023-01-11

## 2023-01-11 ASSESSMENT — PATIENT HEALTH QUESTIONNAIRE - PHQ9
SUM OF ALL RESPONSES TO PHQ QUESTIONS 1-9: 0
1. LITTLE INTEREST OR PLEASURE IN DOING THINGS: 0
SUM OF ALL RESPONSES TO PHQ9 QUESTIONS 1 & 2: 0
2. FEELING DOWN, DEPRESSED OR HOPELESS: 0
SUM OF ALL RESPONSES TO PHQ QUESTIONS 1-9: 0

## 2023-01-11 NOTE — PROGRESS NOTES
Post-Discharge Transitional Care  Follow Up      David Treviño   YOB: 1980    Date of Office Visit:  1/11/2023  Date of Hospital Admission: 12/31/22  Date of Hospital Discharge: 1/4/23  Risk of hospital readmission (high >=14%. Medium >=10%) :Readmission Risk Score: 6.9      Care management risk score Rising risk (score 2-5) and Complex Care (Scores >=6): No Risk Score On File     Non face to face  following discharge, date last encounter closed (first attempt may have been earlier): 01/05/2023    Call initiated 2 business days of discharge: Yes    ASSESSMENT/PLAN:   Mediastinal lymphadenopathy  -     CT CHEST WO CONTRAST; Future  Abnormal CT scan, lung  -     CT CHEST WO CONTRAST; Future  Hospital discharge follow-up  -     MN DISCHARGE MEDS RECONCILED W/ CURRENT OUTPATIENT MED LIST  Centrilobular emphysema (HCC)  -     albuterol sulfate HFA (VENTOLIN HFA) 108 (90 Base) MCG/ACT inhaler; Inhale 2 puffs into the lungs 4 times daily as needed for Wheezing, Disp-54 g, R-1Normal  Heart failure, unspecified HF chronicity, unspecified heart failure type ([facility] [address]  Respiratory failure, unspecified chronicity, unspecified whether with hypoxia or hypercapnia [facility])    Medical Decision Making: high complexity  Return in about 4 weeks (around 2/8/2023).            Subjective:   HPI:  Follow up of Hospital problems/diagnosis(es):     David Treviño is a 40 y.o. male on hospital day 4 with a history of tobacco abuse admitted to the hospital for shortness of breath  Cardiology consulted for the management of heart failure    BNP 3400  Troponins negative  CT chest negative for PE but positive for bilateral pleural effusions  ==============  Patient was placed on cardiomyopathy medications including metoprolol succinate and Entresto  Subsequently on 1/3/2023 patient was brought to the Cath Lab for coronary angiogram where she was found with normal coronaries     There was a plan to send patient home with a LifeVest. Unfortunately insurance denied LifeVest  There was a plan to do a one-to-one appeal.  However patient did not want to wait any longer in the hospital.  He stated that he felt well  Plan to see patient in clinic next week  Patient was [facility] to [facility] for ICD placement consideration    Inpatient course: Discharge summary reviewed- see chart. Interval history/Current status:pt states that overall he is feeling much better. Has been breathing significantly better  Taking all meds  Not smoking or drinking per pt. Had appt with cardio. Going to make appt with electro. Significant family hx of heart disease. Dad had ICD at young age. Patient Active Problem List   Diagnosis    Pilonidal cyst with abscess    Scalp cyst    Dehiscence of operative wound    Lumbar radiculopathy    Herniated lumbar intervertebral disc    Heart failure ([address]    Respiratory failure (HCC)    Mediastinal lymphadenopathy    Shortness of breath    Centrilobular emphysema (HCC)    Bilateral pleural effusion    Tobacco abuse       Medications listed as ordered at the time of discharge from hospital     Medication List            Accurate as of January 11, 2023  9:50 AM. If you have any questions, ask your nurse or doctor. CHANGE how you take these medications      * albuterol sulfate  (90 Base) MCG/ACT inhaler  Commonly known as: PROVENTIL;VENTOLIN;PROAIR  What changed: Another medication with the same name was added. Make sure you understand how and when to take each. Changed by: СЕРГЕЙ Childress CNP     * albuterol sulfate  (90 Base) MCG/ACT inhaler  Commonly known as: Ventolin HFA  Inhale 2 puffs into the lungs 4 times daily as needed for Wheezing  What changed: You were already taking a medication with the same name, and this prescription was added. Make sure you understand how and when to take each.   Changed by: СЕРГЕЙ Childress CNP           * This list has 2 medication(s) that are the same as other medications prescribed for you. Read the directions carefully, and ask your doctor or other care provider to review them with you. CONTINUE taking these medications      furosemide 20 MG tablet  Commonly known as: Lasix  Take 1 tablet by mouth daily     metoprolol succinate 25 MG extended release tablet  Commonly known as: TOPROL XL  Take 1 tablet by mouth daily     sacubitril-valsartan 24-26 MG per tablet  Commonly known as: ENTRESTO  Take 1 tablet by mouth 2 times daily               Where to Get Your Medications        These medications were sent to [address] [place] [address] [id number] [facility], [address] [facility] [address], [facility].      Phone: [phone]   albuterol sulfate  (90 Base) MCG/ACT inhaler           Medications marked \"taking\" at this time  Outpatient Medications Marked as Taking for the 1/11/23 encounter (Office Visit) with СЕРГЕЙ Portillo CNP   Medication Sig Dispense Refill    albuterol sulfate HFA (VENTOLIN HFA) 108 (90 Base) MCG/ACT inhaler Inhale 2 puffs into the lungs 4 times daily as needed for Wheezing 54 g 1    albuterol sulfate HFA (PROVENTIL;VENTOLIN;PROAIR) 108 (90 Base) MCG/ACT inhaler inhale 2 puffs by mouth and INTO THE LUNGS every 4 to 6 hours      furosemide (LASIX) 20 MG tablet Take 1 tablet by mouth daily 60 tablet 3    metoprolol succinate (TOPROL XL) 25 MG extended release tablet Take 1 tablet by mouth daily 30 tablet 3    sacubitril-valsartan (ENTRESTO) 24-26 MG per tablet Take 1 tablet by mouth 2 times daily 180 tablet 5        Medications patient taking as of now reconciled against medications ordered at time of hospital discharge: Yes    A comprehensive review of systems was negative.      Objective:    BP 92/70   Pulse (!) 101   Ht 5' 10\" (1.778 m)   Wt 215 lb (97.5 kg)   SpO2 97%   BMI 30.85 kg/m²   General Appearance: alert and oriented to person, place and time, well developed and well- nourished, in no acute distress  Skin: warm and dry, no rash or erythema  Head: normocephalic and atraumatic  Eyes: pupils equal, round, and reactive to light, extraocular eye movements intact, conjunctivae normal  ENT: tympanic membrane, external ear and ear canal normal bilaterally, nose without deformity, nasal mucosa and turbinates normal without polyps  Neck: supple and non-tender without mass, no thyromegaly or thyroid nodules, no cervical lymphadenopathy  Pulmonary/Chest: clear to auscultation bilaterally- no wheezes, rales or rhonchi, normal air movement, no respiratory distress  Cardiovascular: normal rate, regular rhythm, normal S1 and S2, no murmurs, rubs, clicks, or gallops, distal pulses intact, no carotid bruits  Abdomen: soft, non-tender, non-distended, normal bowel sounds, no masses or organomegaly  Extremities: no cyanosis, clubbing or edema  Musculoskeletal: normal range of motion, no joint swelling, deformity or tenderness  Neurologic: reflexes normal and symmetric, no cranial nerve deficit, gait, coordination and speech normal      An electronic signature was used to authenticate this note.   --José Luis Denson, APRN - CNP

## 2023-01-18 NOTE — PROGRESS NOTES
Physician Progress Note      PATIENTSharriana King  CSN #:                  [id number]  :                       1980  ADMIT DATE:       2022 2:08 PM  Jeremías Bro DATE:        2023 5:26 PM  RESPONDING  PROVIDER #:        Lu Montanez MD          QUERY TEXT:    Pt admitted with unspecified heart failure. If possible, please document in   progress notes and discharge summary further specificity regarding the type   and acuity of CHF:    The medical record reflects the following:  Risk Factors: history of cocaine abuse in remission for 20 years per patient,   smoking  Clinical Indicators: Per Dr. Tamika Diaz cardiomyopathy with a EF 10 to 15%   with LV and RV dilatation. \", SOBx3 weeks, pulmonary edema, bilateral   effusion? Treatment: cardiology consult, Lasix 20MG IV BID, Lopressor, magnesium    Samson SANDRA, RN, [facility]  [phone]  Options provided:  -- Acute on Chronic Systolic CHF/HFrEF  -- Acute Systolic CHF/HFrEF  -- Other - I will add my own diagnosis  -- Disagree - Not applicable / Not valid  -- Disagree - Clinically unable to determine / Unknown  -- Refer to Clinical Documentation Reviewer    PROVIDER RESPONSE TEXT:    This patient is in acute systolic CHF/HFrEF. Query created by: Tamie Fragoso on 1/3/2023 11:37 AM      Electronically signed by:   Lu Montanez MD 2023 2:59 PM

## 2023-01-31 ENCOUNTER — HOSPITAL ENCOUNTER (OUTPATIENT)
Dept: NON INVASIVE DIAGNOSTICS | Age: 43
Discharge: HOME | End: 2023-01-31
Payer: COMMERCIAL

## 2023-01-31 ENCOUNTER — HOSPITAL ENCOUNTER (OUTPATIENT)
Dept: CT IMAGING | Age: 43
Discharge: HOME | End: 2023-02-02
Payer: COMMERCIAL

## 2023-01-31 DIAGNOSIS — I50.9 HEART FAILURE, UNSPECIFIED HF CHRONICITY, UNSPECIFIED HEART FAILURE TYPE (HCC): ICD-10-CM

## 2023-01-31 DIAGNOSIS — R91.8 ABNORMAL CT SCAN, LUNG: ICD-10-CM

## 2023-01-31 DIAGNOSIS — R59.0 MEDIASTINAL LYMPHADENOPATHY: ICD-10-CM

## 2023-01-31 LAB
LV EF: 13 %
LVEF MODALITY: NORMAL

## 2023-01-31 PROCEDURE — 93306 TTE W/DOPPLER COMPLETE: CPT

## 2023-01-31 PROCEDURE — 71250 CT THORAX DX C-: CPT

## 2023-02-02 DIAGNOSIS — R93.89 ABNORMAL CHEST CT: Primary | ICD-10-CM

## 2023-02-02 DIAGNOSIS — J43.9 PULMONARY EMPHYSEMA, UNSPECIFIED EMPHYSEMA TYPE (HCC): ICD-10-CM

## 2023-02-04 ENCOUNTER — TELEPHONE (OUTPATIENT)
Dept: CARDIOLOGY CLINIC | Age: 43
End: 2023-02-04

## 2023-02-04 NOTE — TELEPHONE ENCOUNTER
Received call from patient's wife stating that her  Rakesh's father passed away this morning. She is worried how the stress of the situation would affect the stents heart. Aditi Guardado was not present for the phone call. He is not having any symptoms of chest pain or shortness of breath. Advised patient's wife to monitor closely for any signs of chest pain, worsening exertional dyspnea, near-syncope, or syncope. Should that occur, he is to come to the ER right away. He should also continue to take his medications, avoid alcohol, and try to quit smoking. She expressed understanding and appreciation for the phone call.

## 2023-02-07 ENCOUNTER — APPOINTMENT (OUTPATIENT)
Dept: CT IMAGING | Age: 43
End: 2023-02-07
Payer: COMMERCIAL

## 2023-02-07 ENCOUNTER — HOSPITAL ENCOUNTER (EMERGENCY)
Age: 43
Discharge: HOME | End: 2023-02-07
Payer: COMMERCIAL

## 2023-02-07 VITALS
RESPIRATION RATE: 16 BRPM | HEART RATE: 106 BPM | OXYGEN SATURATION: 98 % | SYSTOLIC BLOOD PRESSURE: 114 MMHG | HEIGHT: 70 IN | BODY MASS INDEX: 29.49 KG/M2 | TEMPERATURE: 98.1 F | DIASTOLIC BLOOD PRESSURE: 82 MMHG | WEIGHT: 206 LBS

## 2023-02-07 DIAGNOSIS — G44.209 TENSION HEADACHE: Primary | ICD-10-CM

## 2023-02-07 LAB
ALBUMIN SERPL-MCNC: 4.2 G/DL (ref 3.5–4.6)
ALP BLD-CCNC: 55 U/L (ref 35–104)
ALT SERPL-CCNC: 17 U/L (ref 0–41)
ANION GAP SERPL CALCULATED.3IONS-SCNC: 12 MEQ/L (ref 9–15)
AST SERPL-CCNC: 15 U/L (ref 0–40)
BASOPHILS ABSOLUTE: 0 K/UL (ref 0–0.2)
BASOPHILS RELATIVE PERCENT: 0.4 %
BILIRUB SERPL-MCNC: 0.3 MG/DL (ref 0.2–0.7)
BUN BLDV-MCNC: 16 MG/DL (ref 6–20)
CALCIUM SERPL-MCNC: 8.5 MG/DL (ref 8.5–9.9)
CHLORIDE BLD-SCNC: 105 MEQ/L (ref 95–107)
CO2: 23 MEQ/L (ref 20–31)
CREAT SERPL-MCNC: 1.04 MG/DL (ref 0.7–1.2)
EOSINOPHILS ABSOLUTE: 0.1 K/UL (ref 0–0.7)
EOSINOPHILS RELATIVE PERCENT: 1.2 %
GFR SERPL CREATININE-BSD FRML MDRD: >60 ML/MIN/{1.73_M2}
GLOBULIN: 1.9 G/DL (ref 2.3–3.5)
GLUCOSE BLD-MCNC: 83 MG/DL (ref 70–99)
HCT VFR BLD CALC: 44.2 % (ref 42–52)
HEMOGLOBIN: 14.8 G/DL (ref 14–18)
LYMPHOCYTES ABSOLUTE: 2.2 K/UL (ref 1–4.8)
LYMPHOCYTES RELATIVE PERCENT: 34.3 %
MCH RBC QN AUTO: 31.4 PG (ref 27–31.3)
MCHC RBC AUTO-ENTMCNC: 33.4 % (ref 33–37)
MCV RBC AUTO: 94 FL (ref 79–92.2)
MONOCYTES ABSOLUTE: 0.4 K/UL (ref 0.2–0.8)
MONOCYTES RELATIVE PERCENT: 7 %
NEUTROPHILS ABSOLUTE: 3.6 K/UL (ref 1.4–6.5)
NEUTROPHILS RELATIVE PERCENT: 57.1 %
PDW BLD-RTO: 13.3 % (ref 11.5–14.5)
PLATELET # BLD: 260 K/UL (ref 130–400)
POTASSIUM SERPL-SCNC: 3.7 MEQ/L (ref 3.4–4.9)
RBC # BLD: 4.7 M/UL (ref 4.7–6.1)
SODIUM BLD-SCNC: 140 MEQ/L (ref 135–144)
TOTAL PROTEIN: 6.1 G/DL (ref 6.3–8)
WBC # BLD: 6.4 K/UL (ref 4.8–10.8)

## 2023-02-07 PROCEDURE — 96374 THER/PROPH/DIAG INJ IV PUSH: CPT

## 2023-02-07 PROCEDURE — 70450 CT HEAD/BRAIN W/O DYE: CPT

## 2023-02-07 PROCEDURE — 2580000003 HC RX 258: Performed by: STUDENT IN AN ORGANIZED HEALTH CARE EDUCATION/TRAINING PROGRAM

## 2023-02-07 PROCEDURE — 36415 COLL VENOUS BLD VENIPUNCTURE: CPT

## 2023-02-07 PROCEDURE — 96361 HYDRATE IV INFUSION ADD-ON: CPT

## 2023-02-07 PROCEDURE — 93005 ELECTROCARDIOGRAM TRACING: CPT | Performed by: STUDENT IN AN ORGANIZED HEALTH CARE EDUCATION/TRAINING PROGRAM

## 2023-02-07 PROCEDURE — 80053 COMPREHEN METABOLIC PANEL: CPT

## 2023-02-07 PROCEDURE — 6360000002 HC RX W HCPCS: Performed by: STUDENT IN AN ORGANIZED HEALTH CARE EDUCATION/TRAINING PROGRAM

## 2023-02-07 PROCEDURE — 96375 TX/PRO/DX INJ NEW DRUG ADDON: CPT

## 2023-02-07 PROCEDURE — 99284 EMERGENCY DEPT VISIT MOD MDM: CPT

## 2023-02-07 PROCEDURE — 85025 COMPLETE CBC W/AUTO DIFF WBC: CPT

## 2023-02-07 RX ORDER — 0.9 % SODIUM CHLORIDE 0.9 %
1000 INTRAVENOUS SOLUTION INTRAVENOUS ONCE
Status: COMPLETED | OUTPATIENT
Start: 2023-02-07 | End: 2023-02-07

## 2023-02-07 RX ORDER — KETOROLAC TROMETHAMINE 15 MG/ML
15 INJECTION, SOLUTION INTRAMUSCULAR; INTRAVENOUS ONCE
Status: COMPLETED | OUTPATIENT
Start: 2023-02-07 | End: 2023-02-07

## 2023-02-07 RX ORDER — METOCLOPRAMIDE HYDROCHLORIDE 5 MG/ML
10 INJECTION INTRAMUSCULAR; INTRAVENOUS ONCE
Status: COMPLETED | OUTPATIENT
Start: 2023-02-07 | End: 2023-02-07

## 2023-02-07 RX ORDER — DIPHENHYDRAMINE HYDROCHLORIDE 50 MG/ML
25 INJECTION INTRAMUSCULAR; INTRAVENOUS ONCE
Status: COMPLETED | OUTPATIENT
Start: 2023-02-07 | End: 2023-02-07

## 2023-02-07 RX ORDER — KETOROLAC TROMETHAMINE 10 MG/1
10 TABLET, FILM COATED ORAL EVERY 6 HOURS PRN
Qty: 20 TABLET | Refills: 0 | Status: SHIPPED | OUTPATIENT
Start: 2023-02-07

## 2023-02-07 RX ADMIN — METOCLOPRAMIDE 10 MG: 5 INJECTION, SOLUTION INTRAMUSCULAR; INTRAVENOUS at 20:32

## 2023-02-07 RX ADMIN — DIPHENHYDRAMINE HYDROCHLORIDE 25 MG: 50 INJECTION, SOLUTION INTRAMUSCULAR; INTRAVENOUS at 20:35

## 2023-02-07 RX ADMIN — KETOROLAC TROMETHAMINE 15 MG: 15 INJECTION, SOLUTION INTRAMUSCULAR; INTRAVENOUS at 20:30

## 2023-02-07 RX ADMIN — SODIUM CHLORIDE 1000 ML: 9 INJECTION, SOLUTION INTRAVENOUS at 20:35

## 2023-02-07 ASSESSMENT — PAIN SCALES - GENERAL
PAINLEVEL_OUTOF10: 3
PAINLEVEL_OUTOF10: 8

## 2023-02-07 ASSESSMENT — PAIN - FUNCTIONAL ASSESSMENT
PAIN_FUNCTIONAL_ASSESSMENT: 0-10
PAIN_FUNCTIONAL_ASSESSMENT: 0-10

## 2023-02-07 ASSESSMENT — LIFESTYLE VARIABLES: HOW OFTEN DO YOU HAVE A DRINK CONTAINING ALCOHOL: PATIENT DECLINED

## 2023-02-08 ENCOUNTER — OFFICE VISIT (OUTPATIENT)
Dept: FAMILY MEDICINE CLINIC | Age: 43
End: 2023-02-08
Payer: COMMERCIAL

## 2023-02-08 ENCOUNTER — TRANSCRIBE ORDERS (OUTPATIENT)
Dept: ADMINISTRATIVE | Age: 43
End: 2023-02-08

## 2023-02-08 VITALS
BODY MASS INDEX: 30.78 KG/M2 | DIASTOLIC BLOOD PRESSURE: 64 MMHG | SYSTOLIC BLOOD PRESSURE: 94 MMHG | HEIGHT: 70 IN | HEART RATE: 111 BPM | WEIGHT: 215 LBS | OXYGEN SATURATION: 99 %

## 2023-02-08 DIAGNOSIS — F17.200 SMOKER: ICD-10-CM

## 2023-02-08 DIAGNOSIS — R59.0 MEDIASTINAL LYMPHADENOPATHY: ICD-10-CM

## 2023-02-08 DIAGNOSIS — I50.9 HEART FAILURE, UNSPECIFIED HF CHRONICITY, UNSPECIFIED HEART FAILURE TYPE (HCC): ICD-10-CM

## 2023-02-08 DIAGNOSIS — J43.2 CENTRILOBULAR EMPHYSEMA (HCC): Primary | ICD-10-CM

## 2023-02-08 DIAGNOSIS — I50.9 HEART FAILURE, UNSPECIFIED HF CHRONICITY, UNSPECIFIED HEART FAILURE TYPE (HCC): Primary | ICD-10-CM

## 2023-02-08 LAB
EKG ATRIAL RATE: 119 BPM
EKG P AXIS: 39 DEGREES
EKG P-R INTERVAL: 174 MS
EKG Q-T INTERVAL: 308 MS
EKG QRS DURATION: 96 MS
EKG QTC CALCULATION (BAZETT): 433 MS
EKG R AXIS: -4 DEGREES
EKG T AXIS: 115 DEGREES
EKG VENTRICULAR RATE: 119 BPM

## 2023-02-08 PROCEDURE — 99214 OFFICE O/P EST MOD 30 MIN: CPT | Performed by: NURSE PRACTITIONER

## 2023-02-08 SDOH — ECONOMIC STABILITY: HOUSING INSECURITY
IN THE LAST 12 MONTHS, WAS THERE A TIME WHEN YOU DID NOT HAVE A STEADY PLACE TO SLEEP OR SLEPT IN A SHELTER (INCLUDING NOW)?: NO

## 2023-02-08 SDOH — ECONOMIC STABILITY: FOOD INSECURITY: WITHIN THE PAST 12 MONTHS, THE FOOD YOU BOUGHT JUST DIDN'T LAST AND YOU DIDN'T HAVE MONEY TO GET MORE.: NEVER TRUE

## 2023-02-08 SDOH — ECONOMIC STABILITY: FOOD INSECURITY: WITHIN THE PAST 12 MONTHS, YOU WORRIED THAT YOUR FOOD WOULD RUN OUT BEFORE YOU GOT MONEY TO BUY MORE.: NEVER TRUE

## 2023-02-08 SDOH — ECONOMIC STABILITY: INCOME INSECURITY: HOW HARD IS IT FOR YOU TO PAY FOR THE VERY BASICS LIKE FOOD, HOUSING, MEDICAL CARE, AND HEATING?: NOT HARD AT ALL

## 2023-02-08 ASSESSMENT — ENCOUNTER SYMPTOMS
SHORTNESS OF BREATH: 0
COUGH: 0
SORE THROAT: 0
DIARRHEA: 0
NAUSEA: 0
CONSTIPATION: 0
DIARRHEA: 0
CHEST TIGHTNESS: 0
SHORTNESS OF BREATH: 0
EYE PAIN: 0
BACK PAIN: 0

## 2023-02-08 NOTE — PROGRESS NOTES
Subjective  Chief Complaint   Patient presents with    Discuss Labs     Discuss imaging  Pt states he was in the ER last night. Panic attack       HPI    Was in the ER last night. HA has improved  Feels like he had a panic attack. Was at dad's .  Overall feeling better. Has been following with cardiology for CHF. EF still low but clinically pt is feeling better. Potentially will need an ICD placed. Has not been drinking alcohol. Has started smoking again. Would like to quit. Still has chantix at home. Wanted to make sure it was safe. Had repeat chest CT.  Has improved for the most part  Showed emphysema, nodule, improving lymphadenopathy      Patient Active Problem List    Diagnosis Date Noted    Mediastinal lymphadenopathy 2023    Shortness of breath 2023    Centrilobular emphysema ([address] 2023    Bilateral pleural effusion 2023    Tobacco abuse 2023    Heart failure ([address] 2022    Respiratory failure ([address] 2022    Lumbar radiculopathy 2018    Herniated lumbar intervertebral disc 2018    Dehiscence of operative wound 2013    Scalp cyst 2013    Pilonidal cyst with abscess 2013     Past Medical History:   Diagnosis Date    Centrilobular emphysema ([address] 2023    Lumbar radiculopathy 2018     Past Surgical History:   Procedure Laterality Date    CYST REMOVAL      neck/back of head    OTHER SURGICAL HISTORY  13    pilonidol cyst    OTHER SURGICAL HISTORY  3/4/13    Repair of scalp wound dehiscence    OTHER SURGICAL HISTORY  3/4/13    exploration of scalp wound with ligation of bleeder    WISDOM TOOTH EXTRACTION       Family History   Problem Relation Age of Onset    Diabetes Father     Cancer Neg Hx     Breast Cancer Neg Hx     Colon Cancer Neg Hx     Prostate Cancer Neg Hx     High Cholesterol Neg Hx     High Blood Pressure Neg Hx      Social History     Socioeconomic History    Marital status: Single     Spouse name: None    Number of children: None    Years of education: None    Highest education level: None   Tobacco Use    Smoking status: Every Day     Packs/day: 1.00     Years: 18.00     Pack years: 18.00     Types: Cigarettes    Smokeless tobacco: Never   Substance and Sexual Activity    Alcohol use: Yes    Drug use: Yes     Types: Marijuana Bridgette Toribioa)    Sexual activity: Not Currently     Social Determinants of Health     Financial Resource Strain: Low Risk     Difficulty of Paying Living Expenses: Not hard at all   Food Insecurity: No Food Insecurity    Worried About Running Out of Food in the Last Year: Never true    Ran Out of Food in the Last Year: Never true   Transportation Needs: Unknown    Lack of Transportation (Non-Medical): No   Housing Stability: Unknown    Unstable Housing in the Last Year: No     Current Outpatient Medications on File Prior to Visit   Medication Sig Dispense Refill    ketorolac (TORADOL) 10 MG tablet Take 1 tablet by mouth every 6 hours as needed for Pain 20 tablet 0    albuterol sulfate HFA (VENTOLIN HFA) 108 (90 Base) MCG/ACT inhaler Inhale 2 puffs into the lungs 4 times daily as needed for Wheezing 54 g 1    albuterol sulfate HFA (PROVENTIL;VENTOLIN;PROAIR) 108 (90 Base) MCG/ACT inhaler inhale 2 puffs by mouth and INTO THE LUNGS every 4 to 6 hours      furosemide (LASIX) 20 MG tablet Take 1 tablet by mouth daily 60 tablet 3    metoprolol succinate (TOPROL XL) 25 MG extended release tablet Take 1 tablet by mouth daily 30 tablet 3    sacubitril-valsartan (ENTRESTO) 24-26 MG per tablet Take 1 tablet by mouth 2 times daily 180 tablet 5     No current facility-administered medications on file prior to visit. Allergies   Allergen Reactions    Vicodin [Hydrocodone-Acetaminophen] Nausea Only and Other (See Comments)     HEADACHES    Codeine Nausea And Vomiting       Review of Systems   Constitutional:  Positive for fatigue. Respiratory:  Negative for cough and shortness of breath. Cardiovascular:  Negative for chest pain and leg swelling. Gastrointestinal:  Negative for constipation and diarrhea. Objective  Vitals:    02/08/23 1001   BP: 94/64   Pulse: (!) 111   SpO2: 99%   Weight: 215 lb (97.5 kg)   Height: 5' 10\" (1.778 m)     Physical Exam  Vitals and nursing note reviewed. Constitutional:       Appearance: Normal appearance. HENT:      Head: Normocephalic. Nose: Nose normal.      Mouth/Throat:      Mouth: Mucous membranes are moist.      Pharynx: Oropharynx is clear. Eyes:      Extraocular Movements: Extraocular movements intact. Conjunctiva/sclera: Conjunctivae normal.      Pupils: Pupils are equal, round, and reactive to light. Cardiovascular:      Rate and Rhythm: Normal rate and regular rhythm. Pulses: Normal pulses. Heart sounds: Normal heart sounds. Pulmonary:      Effort: Pulmonary effort is normal.      Breath sounds: Normal breath sounds. Musculoskeletal:      Cervical back: Neck supple. Right lower leg: No edema. Left lower leg: No edema. Neurological:      General: No focal deficit present. Mental Status: He is alert and oriented to person, place, and time. Mental status is at baseline. Psychiatric:         Mood and Affect: Mood normal.         Behavior: Behavior normal.         Thought Content: Thought content normal.         Judgment: Judgment normal.         Assessment & Plan     Diagnosis Orders   1. Centrilobular emphysema (HCC)  Stable. Declined needing any maintenance inhalers      2. Heart failure, unspecified HF chronicity, unspecified heart failure type ([facility] [address]  Will fu with cardiology consider ICD      3. Smoker  Suggested starting the chantx. 4. Mediastinal lymphadenopathy  Improving.  Fu with pulmonary          Side effects, adverse effects of the medication prescribed today, as well as treatment plan/ rationale and result expectations have been discussed with the patient who expresses understanding and desires to proceed. Close follow up to evaluate treatment results and for coordination of care. I have reviewed the patient's medical history in detail and updated the computerized patient record. As always, patient is advised that if symptoms worsen in any way they will proceed to the nearest emergency room. No orders of the defined types were placed in this encounter. No orders of the defined types were placed in this encounter. Return in about 2 months (around 4/8/2023).     Cali Galarza, СЕРГЕЙ - CNP

## 2023-02-08 NOTE — ED TRIAGE NOTES
Pt presents to ED via EMS c/o 8/10 frontal HA. Pt was attending father's  when he had sudden onsent HA. Pt became diaphoretic, dizzy, and had near syncopal episode. Pt has hx CHF. Admits to ETOH use today. Per EMS, initial pt b/p 100 sys; . Pt continues to have 8/10 HA; denies dizziness at this time.

## 2023-02-08 NOTE — ED NOTES
Pt signed AMA. Aware of risks of leaving up to and including death. Pt signed AMA with this RN and JEFF Jones present at bedside.      Saad Haines RN  02/07/23 1871

## 2023-02-08 NOTE — ED NOTES
Pt requesting to leave. Does not want to wait for results of CT. JEFF Jones aware.      Haley Kaye RN  02/07/23 2421

## 2023-02-08 NOTE — ED PROVIDER NOTES
[address] [facility] ED  eMERGENCYdEPARTMENT eNCOUnter      Pt Name: Elna Apley  MRN: [id number]  [place] 1980  Date of evaluation: 2023  Alejandro Busch PA-C    CHIEF COMPLAINT           HPI  Elna Apley is a 40 y.o. male presents with a headache. Patient states he was sitting at his father's  when he started getting a gradual onset, worsening, severe bilateral temporal headache. States it is not the worst headache of his life, denies nausea, vomiting, fever, chills. ROS  Review of Systems   Constitutional:  Negative for chills, fatigue and fever. HENT:  Negative for ear pain, hearing loss and sore throat. Eyes:  Negative for pain and visual disturbance. Respiratory:  Negative for chest tightness and shortness of breath. Cardiovascular:  Negative for chest pain. Gastrointestinal:  Negative for diarrhea and nausea. Endocrine: Negative for cold intolerance. Genitourinary:  Negative for hematuria. Musculoskeletal:  Negative for back pain. Skin:  Negative for rash and wound. Neurological:  Positive for headaches. Negative for dizziness. Psychiatric/Behavioral:  Negative for behavioral problems and confusion. Except as noted above the remainder of the review of systems was reviewed and negative.        PAST MEDICAL HISTORY     Past Medical History:   Diagnosis Date    Centrilobular emphysema ([address] 2023    Lumbar radiculopathy 2018         SURGICAL HISTORY       Past Surgical History:   Procedure Laterality Date    CYST REMOVAL      neck/back of head    OTHER SURGICAL HISTORY  13    pilonidol cyst    OTHER SURGICAL HISTORY  3/4/13    Repair of scalp wound dehiscence    OTHER SURGICAL HISTORY  3/4/13    exploration of scalp wound with ligation of bleeder    WISDOM TOOTH EXTRACTION           CURRENTMEDICATIONS       Discharge Medication List as of 2023 10:06 PM        CONTINUE these medications which have NOT CHANGED    Details   !! albuterol sulfate HFA (VENTOLIN HFA) 108 (90 Base) MCG/ACT inhaler Inhale 2 puffs into the lungs 4 times daily as needed for Wheezing, Disp-54 g, R-1Normal      !! albuterol sulfate HFA (PROVENTIL;VENTOLIN;PROAIR) 108 (90 Base) MCG/ACT inhaler inhale 2 puffs by mouth and INTO THE LUNGS every 4 to 6 hoursHistorical Med      furosemide (LASIX) 20 MG tablet Take 1 tablet by mouth daily, Disp-60 tablet, R-3Normal      metoprolol succinate (TOPROL XL) 25 MG extended release tablet Take 1 tablet by mouth daily, Disp-30 tablet, R-3Normal      sacubitril-valsartan (ENTRESTO) 24-26 MG per tablet Take 1 tablet by mouth 2 times daily, Disp-180 tablet, R-5Normal       !! - Potential duplicate medications found. Please discuss with provider.           ALLERGIES     Vicodin [hydrocodone-acetaminophen] and Codeine    FAMILY HISTORY       Family History   Problem Relation Age of Onset    Diabetes Father     Cancer Neg Hx     Breast Cancer Neg Hx     Colon Cancer Neg Hx     Prostate Cancer Neg Hx     High Cholesterol Neg Hx     High Blood Pressure Neg Hx           SOCIAL HISTORY       Social History     Socioeconomic History    Marital status: Single     Spouse name: None    Number of children: None    Years of education: None    Highest education level: None   Tobacco Use    Smoking status: Every Day     Packs/day: 1.00     Years: 18.00     Pack years: 18.00     Types: Cigarettes    Smokeless tobacco: Never   Substance and Sexual Activity    Alcohol use: Yes    Drug use: Yes     Types: Marijuana [place])    Sexual activity: Not Currently     Social Determinants of Health     Financial Resource Strain: Low Risk     Difficulty of Paying Living Expenses: Not hard at all   Food Insecurity: No Food Insecurity    Worried About Running Out of Food in the Last Year: Never true    Ran Out of Food in the Last Year: Never true         PHYSICAL EXAM       ED Triage Vitals [02/07/23 2000]   BP Temp Temp Source Heart Rate Resp SpO2 Height Weight 99/70 98.1 °F (36.7 °C) Oral (!) 116 16 98 % 5' 10\" (1.778 m) 206 lb (93.4 kg)       Physical Exam  Constitutional:       Appearance: Normal appearance. HENT:      Head: Normocephalic and atraumatic. Nose: Nose normal.      Mouth/Throat:      Mouth: Mucous membranes are moist.      Pharynx: No oropharyngeal exudate or posterior oropharyngeal erythema. Eyes:      Extraocular Movements: Extraocular movements intact. Conjunctiva/sclera: Conjunctivae normal.      Pupils: Pupils are equal, round, and reactive to light. Cardiovascular:      Rate and Rhythm: Normal rate and regular rhythm. Heart sounds: Normal heart sounds. Pulmonary:      Effort: Pulmonary effort is normal.      Breath sounds: Normal breath sounds. No wheezing or rhonchi. Abdominal:      General: Bowel sounds are normal.      Palpations: Abdomen is soft. Tenderness: There is no abdominal tenderness. There is no guarding. Musculoskeletal:         General: No deformity. Normal range of motion. Cervical back: Normal range of motion and neck supple. Skin:     General: Skin is warm and dry. Coloration: Skin is not jaundiced. Neurological:      General: No focal deficit present. Mental Status: He is alert and oriented to person, place, and time. GCS: GCS eye subscore is 4. GCS verbal subscore is 5. GCS motor subscore is 6. Cranial Nerves: Cranial nerves 2-12 are intact. Sensory: Sensation is intact. Motor: Motor function is intact. Coordination: Coordination is intact. Gait: Gait is intact. Deep Tendon Reflexes: Reflexes are normal and symmetric. Psychiatric:         Mood and Affect: Mood normal.         Behavior: Behavior normal.         MDM  Is a 50-year-old male presenting for headache. Patient afebrile and stable. Patient given IV fluids, IV Toradol, IV Benadryl, IV Reglan. Labs unremarkable. CT head negative for bleed.   Before CT scan results were reported patient requesting discharge. Patient left AGAINST MEDICAL ADVICE and agreeable to outpatient treatment for likely tension headache. He will return with any change or worsening symptoms. FINAL IMPRESSION      1.  Tension headache          DISPOSITION/PLAN   DISPOSITION Decision To Discharge 02/07/2023 10:05:33 PM        DISCHARGE MEDICATIONS:  [unfilled]         More Roberson PA-C(electronically signed)  Attending Emergency Physician           More Roberson PA-C  02/08/23 9059

## 2023-04-20 ENCOUNTER — TELEPHONE (OUTPATIENT)
Dept: FAMILY MEDICINE CLINIC | Age: 43
End: 2023-04-20

## 2023-04-20 DIAGNOSIS — L25.5 CONTACT DERMATITIS DUE TO PLANTS, EXCEPT FOOD, UNSPECIFIED CONTACT DERMATITIS TYPE: Primary | ICD-10-CM

## 2023-04-20 RX ORDER — METHYLPREDNISOLONE 4 MG/1
TABLET ORAL
Qty: 21 TABLET | Refills: 0 | Status: SHIPPED | OUTPATIENT
Start: 2023-04-20 | End: 2023-04-26

## 2023-05-10 ENCOUNTER — HOSPITAL ENCOUNTER (EMERGENCY)
Age: 43
Discharge: HOME | End: 2023-05-10
Attending: EMERGENCY MEDICINE
Payer: COMMERCIAL

## 2023-05-10 ENCOUNTER — APPOINTMENT (OUTPATIENT)
Dept: GENERAL RADIOLOGY | Age: 43
End: 2023-05-10
Payer: COMMERCIAL

## 2023-05-10 VITALS
OXYGEN SATURATION: 100 % | HEART RATE: 79 BPM | WEIGHT: 215 LBS | RESPIRATION RATE: 18 BRPM | HEIGHT: 71 IN | DIASTOLIC BLOOD PRESSURE: 72 MMHG | BODY MASS INDEX: 30.1 KG/M2 | TEMPERATURE: 98.9 F | SYSTOLIC BLOOD PRESSURE: 118 MMHG

## 2023-05-10 DIAGNOSIS — R07.9 CHEST PAIN, UNSPECIFIED TYPE: Primary | ICD-10-CM

## 2023-05-10 LAB
ALBUMIN SERPL-MCNC: 4.3 G/DL (ref 3.5–4.6)
ALP SERPL-CCNC: 58 U/L (ref 35–104)
ALT SERPL-CCNC: 31 U/L (ref 0–41)
ANION GAP SERPL CALCULATED.3IONS-SCNC: 10 MEQ/L (ref 9–15)
AST SERPL-CCNC: 32 U/L (ref 0–40)
BASOPHILS # BLD: 0 K/UL (ref 0–0.2)
BASOPHILS NFR BLD: 0.5 %
BILIRUB SERPL-MCNC: 0.5 MG/DL (ref 0.2–0.7)
BUN SERPL-MCNC: 15 MG/DL (ref 6–20)
CALCIUM SERPL-MCNC: 9.2 MG/DL (ref 8.5–9.9)
CHLORIDE SERPL-SCNC: 103 MEQ/L (ref 95–107)
CO2 SERPL-SCNC: 23 MEQ/L (ref 20–31)
CREAT SERPL-MCNC: 1.01 MG/DL (ref 0.7–1.2)
EOSINOPHIL # BLD: 0 K/UL (ref 0–0.7)
EOSINOPHIL NFR BLD: 0.7 %
ERYTHROCYTE [DISTWIDTH] IN BLOOD BY AUTOMATED COUNT: 14.1 % (ref 11.5–14.5)
GLOBULIN SER CALC-MCNC: 2.4 G/DL (ref 2.3–3.5)
GLUCOSE SERPL-MCNC: 108 MG/DL (ref 70–99)
HCT VFR BLD AUTO: 40.7 % (ref 42–52)
HGB BLD-MCNC: 14.1 G/DL (ref 14–18)
LYMPHOCYTES # BLD: 1.8 K/UL (ref 1–4.8)
LYMPHOCYTES NFR BLD: 26.5 %
MCH RBC QN AUTO: 32.5 PG (ref 27–31.3)
MCHC RBC AUTO-ENTMCNC: 34.6 % (ref 33–37)
MCV RBC AUTO: 94.1 FL (ref 79–92.2)
MONOCYTES # BLD: 0.5 K/UL (ref 0.2–0.8)
MONOCYTES NFR BLD: 8.1 %
NEUTROPHILS # BLD: 4.3 K/UL (ref 1.4–6.5)
NEUTS SEG NFR BLD: 64.2 %
PLATELET # BLD AUTO: 243 K/UL (ref 130–400)
POTASSIUM SERPL-SCNC: 4 MEQ/L (ref 3.4–4.9)
PROT SERPL-MCNC: 6.7 G/DL (ref 6.3–8)
RBC # BLD AUTO: 4.33 M/UL (ref 4.7–6.1)
SODIUM SERPL-SCNC: 136 MEQ/L (ref 135–144)
TROPONIN T SERPL-MCNC: <0.01 NG/ML (ref 0–0.01)
WBC # BLD AUTO: 6.6 K/UL (ref 4.8–10.8)

## 2023-05-10 PROCEDURE — 80053 COMPREHEN METABOLIC PANEL: CPT

## 2023-05-10 PROCEDURE — 99284 EMERGENCY DEPT VISIT MOD MDM: CPT

## 2023-05-10 PROCEDURE — 36415 COLL VENOUS BLD VENIPUNCTURE: CPT

## 2023-05-10 PROCEDURE — 85025 COMPLETE CBC W/AUTO DIFF WBC: CPT

## 2023-05-10 PROCEDURE — 93005 ELECTROCARDIOGRAM TRACING: CPT | Performed by: EMERGENCY MEDICINE

## 2023-05-10 PROCEDURE — 84484 ASSAY OF TROPONIN QUANT: CPT

## 2023-05-10 PROCEDURE — 71045 X-RAY EXAM CHEST 1 VIEW: CPT

## 2023-05-10 RX ORDER — METOPROLOL SUCCINATE 25 MG/1
25 TABLET, EXTENDED RELEASE ORAL DAILY
Qty: 30 TABLET | Refills: 0 | Status: SHIPPED | OUTPATIENT
Start: 2023-05-10 | End: 2023-06-09

## 2023-05-10 ASSESSMENT — ENCOUNTER SYMPTOMS
DIARRHEA: 0
SHORTNESS OF BREATH: 0
ABDOMINAL PAIN: 0
CHEST TIGHTNESS: 0
BLOOD IN STOOL: 0
VOMITING: 0
SORE THROAT: 0
COUGH: 0
BACK PAIN: 0

## 2023-05-10 ASSESSMENT — LIFESTYLE VARIABLES
HOW MANY STANDARD DRINKS CONTAINING ALCOHOL DO YOU HAVE ON A TYPICAL DAY: PATIENT DOES NOT DRINK
HOW OFTEN DO YOU HAVE A DRINK CONTAINING ALCOHOL: NEVER

## 2023-05-10 ASSESSMENT — PAIN DESCRIPTION - DIRECTION: RADIATING_TOWARDS: DOWN L ARM

## 2023-05-10 ASSESSMENT — PAIN DESCRIPTION - PAIN TYPE: TYPE: ACUTE PAIN

## 2023-05-10 ASSESSMENT — PAIN SCALES - GENERAL: PAINLEVEL_OUTOF10: 0

## 2023-05-10 ASSESSMENT — PAIN - FUNCTIONAL ASSESSMENT: PAIN_FUNCTIONAL_ASSESSMENT: 0-10

## 2023-05-10 NOTE — ED TRIAGE NOTES
Patient has had intermittent chest pain for 2 weeks, he has been out of his metoprolol for a couple of weeks.

## 2023-05-10 NOTE — ED PROVIDER NOTES
[address] [facility] ED  EMERGENCY DEPARTMENT ENCOUNTER      Pt Name: Cammie Simon  MRN: [id number]  [place] 1980  Date of evaluation: 5/10/2023  Provider: Austyn Okeefe MD    [address] [place] [address]       Chief Complaint   Patient presents with    Chest Pain     CP off and on for about 2 weeks ran out of his meds         HISTORY OF PRESENT ILLNESS   (Location/Symptom, Timing/Onset, Context/Setting, Quality, Duration, Modifying Factors, Severity)  Note limiting factors. Cammie Simon is a 40 y.o. male who presents to the emergency department      Is been having on and off chest pain. He notes that he has had cardiac testing including cardiac cath several months ago which was normal.  He notes that the main change is that he ran out of his metoprolol and feels that he related to symptoms. He denies any current symptoms he did take an aspirin prior to arrival.  Denies any other aggravating relieving factors. Nursing Notes were reviewed. REVIEW OF SYSTEMS    (2-9 systems for level 4, 10 or more for level 5)     Review of Systems   Constitutional:  Negative for chills and fever. HENT:  Negative for ear pain and sore throat. Respiratory:  Negative for cough, chest tightness and shortness of breath. Cardiovascular:  Positive for chest pain. Negative for leg swelling. Gastrointestinal:  Negative for abdominal pain, blood in stool, diarrhea and vomiting. Genitourinary:  Negative for flank pain. Musculoskeletal:  Negative for back pain. Neurological:  Negative for dizziness, syncope and numbness. Psychiatric/Behavioral:  Negative for self-injury and suicidal ideas. All other systems reviewed and are negative. Except as noted above the remainder of the review of systems was reviewed and negative.        PAST MEDICAL HISTORY     Past Medical History:   Diagnosis Date    Centrilobular emphysema ([facility] [address] 1/1/2023    Lumbar radiculopathy 6/7/2018         SURGICAL HISTORY       Past Surgical

## 2023-05-10 NOTE — ED NOTES
Labs sent at this time, EKG completed,  family at the bedside at this time  Patient states after 324mg ASA the pain is gone      Jyoti Hansen [facility]  05/10/23 0305

## 2023-05-11 ENCOUNTER — TELEPHONE (OUTPATIENT)
Dept: FAMILY MEDICINE CLINIC | Age: 43
End: 2023-05-11

## 2023-05-11 NOTE — TELEPHONE ENCOUNTER
[facility] ([facility]) ED Follow up Call    Reason for ED visit:  Chest pain, unspecified type               FU appts/Provider:    Future Appointments   Date Time Provider Chuck rByson   7/11/2023  9:15 AM СЕРГЕЙ Reyes - CNP [facility] [address] IF NOT USED  Hi, this message is for  Demetrio Meyer  This is Geno Bran from [address] [place] [address] office. Just calling to see how you are doing after your recent visit to the Emergency Room. [address] [place] [address] wants to make sure you were able to fill any prescriptions and that you understand your discharge instructions. Please return our call if you need to make a follow up appointment with your provider or have any further needs. Our phone number is [phone]. Have a great day.

## 2023-05-12 LAB
EKG ATRIAL RATE: 75 BPM
EKG P AXIS: 19 DEGREES
EKG P-R INTERVAL: 152 MS
EKG Q-T INTERVAL: 400 MS
EKG QRS DURATION: 98 MS
EKG QTC CALCULATION (BAZETT): 446 MS
EKG R AXIS: 13 DEGREES
EKG T AXIS: 38 DEGREES
EKG VENTRICULAR RATE: 75 BPM

## 2023-06-24 ENCOUNTER — APPOINTMENT (OUTPATIENT)
Dept: CT IMAGING | Age: 43
End: 2023-06-24
Payer: COMMERCIAL

## 2023-06-24 ENCOUNTER — HOSPITAL ENCOUNTER (OUTPATIENT)
Age: 43
Setting detail: OBSERVATION
Discharge: ANOTHER HOSPITAL | End: 2023-06-25
Attending: INTERNAL MEDICINE | Admitting: INTERNAL MEDICINE
Payer: COMMERCIAL

## 2023-06-24 ENCOUNTER — APPOINTMENT (OUTPATIENT)
Dept: GENERAL RADIOLOGY | Age: 43
End: 2023-06-24
Payer: COMMERCIAL

## 2023-06-24 DIAGNOSIS — R55 SYNCOPE AND COLLAPSE: Primary | ICD-10-CM

## 2023-06-24 DIAGNOSIS — R00.2 PALPITATIONS: ICD-10-CM

## 2023-06-24 PROBLEM — R07.9 CHEST PAIN: Status: ACTIVE | Noted: 2023-06-24

## 2023-06-24 LAB
ALBUMIN SERPL-MCNC: 4.8 G/DL (ref 3.5–4.6)
ALP SERPL-CCNC: 70 U/L (ref 35–104)
ALT SERPL-CCNC: 25 U/L (ref 0–41)
ANION GAP SERPL CALCULATED.3IONS-SCNC: 10 MEQ/L (ref 9–15)
AST SERPL-CCNC: 21 U/L (ref 0–40)
BASOPHILS # BLD: 0 K/UL (ref 0–0.1)
BASOPHILS NFR BLD: 0.4 % (ref 0.2–1.2)
BILIRUB SERPL-MCNC: 0.7 MG/DL (ref 0.2–0.7)
BNP BLD-MCNC: 286 PG/ML
BUN SERPL-MCNC: 20 MG/DL (ref 6–20)
CALCIUM SERPL-MCNC: 9.8 MG/DL (ref 8.5–9.9)
CHLORIDE SERPL-SCNC: 102 MEQ/L (ref 95–107)
CO2 SERPL-SCNC: 24 MEQ/L (ref 20–31)
CREAT SERPL-MCNC: 0.97 MG/DL (ref 0.7–1.2)
EKG ATRIAL RATE: 90 BPM
EKG P AXIS: 18 DEGREES
EKG P-R INTERVAL: 146 MS
EKG Q-T INTERVAL: 368 MS
EKG QRS DURATION: 98 MS
EKG QTC CALCULATION (BAZETT): 450 MS
EKG R AXIS: -1 DEGREES
EKG T AXIS: -20 DEGREES
EKG VENTRICULAR RATE: 90 BPM
EOSINOPHIL # BLD: 0.1 K/UL (ref 0–0.5)
EOSINOPHIL NFR BLD: 0.5 % (ref 0.8–7)
ERYTHROCYTE [DISTWIDTH] IN BLOOD BY AUTOMATED COUNT: 12.4 % (ref 11.6–14.4)
ETHANOL PERCENT: NORMAL G/DL
ETHANOLAMINE SERPL-MCNC: <10 MG/DL (ref 0–0.08)
GLOBULIN SER CALC-MCNC: 2.3 G/DL (ref 2.3–3.5)
GLUCOSE SERPL-MCNC: 148 MG/DL (ref 70–99)
HCT VFR BLD AUTO: 43.5 % (ref 42–52)
HGB BLD-MCNC: 15.3 G/DL (ref 13.7–17.5)
IMM GRANULOCYTES # BLD: 0 K/UL
IMM GRANULOCYTES NFR BLD: 0.4 %
LYMPHOCYTES # BLD: 2.1 K/UL (ref 1.3–3.6)
LYMPHOCYTES NFR BLD: 21.3 %
MAGNESIUM SERPL-MCNC: 2 MG/DL (ref 1.7–2.4)
MCH RBC QN AUTO: 32.8 PG (ref 25.7–32.2)
MCHC RBC AUTO-ENTMCNC: 35.2 % (ref 32.3–36.5)
MCV RBC AUTO: 93.3 FL (ref 79–92.2)
MONOCYTES # BLD: 0.5 K/UL (ref 0.3–0.8)
MONOCYTES NFR BLD: 5.3 % (ref 5.3–12.2)
NEUTROPHILS # BLD: 7 K/UL (ref 1.8–5.4)
NEUTS SEG NFR BLD: 72.1 % (ref 34–67.9)
PLATELET # BLD AUTO: 277 K/UL (ref 163–337)
POTASSIUM SERPL-SCNC: 4.2 MEQ/L (ref 3.4–4.9)
PROT SERPL-MCNC: 7.1 G/DL (ref 6.3–8)
RBC # BLD AUTO: 4.66 M/UL (ref 4.63–6.08)
SODIUM SERPL-SCNC: 136 MEQ/L (ref 135–144)
TROPONIN T SERPL-MCNC: <0.01 NG/ML (ref 0–0.01)
TSH REFLEX: 0.46 UIU/ML (ref 0.44–3.86)
WBC # BLD AUTO: 9.7 K/UL (ref 4.2–9)

## 2023-06-24 PROCEDURE — 93005 ELECTROCARDIOGRAM TRACING: CPT

## 2023-06-24 PROCEDURE — 83880 ASSAY OF NATRIURETIC PEPTIDE: CPT

## 2023-06-24 PROCEDURE — 70450 CT HEAD/BRAIN W/O DYE: CPT

## 2023-06-24 PROCEDURE — 83735 ASSAY OF MAGNESIUM: CPT

## 2023-06-24 PROCEDURE — 71045 X-RAY EXAM CHEST 1 VIEW: CPT

## 2023-06-24 PROCEDURE — 6370000000 HC RX 637 (ALT 250 FOR IP): Performed by: INTERNAL MEDICINE

## 2023-06-24 PROCEDURE — 2580000003 HC RX 258: Performed by: INTERNAL MEDICINE

## 2023-06-24 PROCEDURE — 85025 COMPLETE CBC W/AUTO DIFF WBC: CPT

## 2023-06-24 PROCEDURE — 6370000000 HC RX 637 (ALT 250 FOR IP): Performed by: NURSE PRACTITIONER

## 2023-06-24 PROCEDURE — 84443 ASSAY THYROID STIM HORMONE: CPT

## 2023-06-24 PROCEDURE — 99285 EMERGENCY DEPT VISIT HI MDM: CPT

## 2023-06-24 PROCEDURE — G0378 HOSPITAL OBSERVATION PER HR: HCPCS

## 2023-06-24 PROCEDURE — 93010 ELECTROCARDIOGRAM REPORT: CPT | Performed by: INTERNAL MEDICINE

## 2023-06-24 PROCEDURE — 96360 HYDRATION IV INFUSION INIT: CPT

## 2023-06-24 PROCEDURE — 96361 HYDRATE IV INFUSION ADD-ON: CPT

## 2023-06-24 PROCEDURE — 84484 ASSAY OF TROPONIN QUANT: CPT

## 2023-06-24 PROCEDURE — 2580000003 HC RX 258: Performed by: NURSE PRACTITIONER

## 2023-06-24 PROCEDURE — 36415 COLL VENOUS BLD VENIPUNCTURE: CPT

## 2023-06-24 PROCEDURE — 82077 ASSAY SPEC XCP UR&BREATH IA: CPT

## 2023-06-24 PROCEDURE — 80053 COMPREHEN METABOLIC PANEL: CPT

## 2023-06-24 RX ORDER — ENOXAPARIN SODIUM 100 MG/ML
40 INJECTION SUBCUTANEOUS DAILY
Status: DISCONTINUED | OUTPATIENT
Start: 2023-06-24 | End: 2023-06-25 | Stop reason: HOSPADM

## 2023-06-24 RX ORDER — METOPROLOL SUCCINATE 25 MG/1
25 TABLET, EXTENDED RELEASE ORAL DAILY
Status: DISCONTINUED | OUTPATIENT
Start: 2023-06-24 | End: 2023-06-25

## 2023-06-24 RX ORDER — ONDANSETRON 2 MG/ML
4 INJECTION INTRAMUSCULAR; INTRAVENOUS EVERY 6 HOURS PRN
Status: DISCONTINUED | OUTPATIENT
Start: 2023-06-24 | End: 2023-06-25 | Stop reason: HOSPADM

## 2023-06-24 RX ORDER — FUROSEMIDE 20 MG/1
20 TABLET ORAL DAILY
Status: DISCONTINUED | OUTPATIENT
Start: 2023-06-24 | End: 2023-06-25 | Stop reason: HOSPADM

## 2023-06-24 RX ORDER — NICOTINE 21 MG/24HR
1 PATCH, TRANSDERMAL 24 HOURS TRANSDERMAL DAILY
Status: DISCONTINUED | OUTPATIENT
Start: 2023-06-24 | End: 2023-06-25 | Stop reason: HOSPADM

## 2023-06-24 RX ORDER — POLYETHYLENE GLYCOL 3350 17 G/17G
17 POWDER, FOR SOLUTION ORAL DAILY PRN
Status: DISCONTINUED | OUTPATIENT
Start: 2023-06-24 | End: 2023-06-25 | Stop reason: HOSPADM

## 2023-06-24 RX ORDER — SODIUM CHLORIDE 0.9 % (FLUSH) 0.9 %
5-40 SYRINGE (ML) INJECTION PRN
Status: DISCONTINUED | OUTPATIENT
Start: 2023-06-24 | End: 2023-06-25 | Stop reason: HOSPADM

## 2023-06-24 RX ORDER — ONDANSETRON 4 MG/1
4 TABLET, ORALLY DISINTEGRATING ORAL EVERY 8 HOURS PRN
Status: DISCONTINUED | OUTPATIENT
Start: 2023-06-24 | End: 2023-06-25 | Stop reason: HOSPADM

## 2023-06-24 RX ORDER — ONDANSETRON 4 MG/1
4 TABLET, ORALLY DISINTEGRATING ORAL ONCE
Status: COMPLETED | OUTPATIENT
Start: 2023-06-24 | End: 2023-06-24

## 2023-06-24 RX ORDER — HYDROCODONE BITARTRATE AND ACETAMINOPHEN 5; 325 MG/1; MG/1
1 TABLET ORAL ONCE
Status: COMPLETED | OUTPATIENT
Start: 2023-06-24 | End: 2023-06-24

## 2023-06-24 RX ORDER — SODIUM CHLORIDE 0.9 % (FLUSH) 0.9 %
5-40 SYRINGE (ML) INJECTION EVERY 12 HOURS SCHEDULED
Status: DISCONTINUED | OUTPATIENT
Start: 2023-06-24 | End: 2023-06-25 | Stop reason: HOSPADM

## 2023-06-24 RX ORDER — ACETAMINOPHEN 325 MG/1
650 TABLET ORAL EVERY 6 HOURS PRN
Status: DISCONTINUED | OUTPATIENT
Start: 2023-06-24 | End: 2023-06-25 | Stop reason: HOSPADM

## 2023-06-24 RX ORDER — 0.9 % SODIUM CHLORIDE 0.9 %
1000 INTRAVENOUS SOLUTION INTRAVENOUS ONCE
Status: COMPLETED | OUTPATIENT
Start: 2023-06-24 | End: 2023-06-24

## 2023-06-24 RX ORDER — SODIUM CHLORIDE 9 MG/ML
INJECTION, SOLUTION INTRAVENOUS PRN
Status: DISCONTINUED | OUTPATIENT
Start: 2023-06-24 | End: 2023-06-25 | Stop reason: HOSPADM

## 2023-06-24 RX ORDER — VALACYCLOVIR HYDROCHLORIDE 500 MG/1
500 TABLET, FILM COATED ORAL DAILY
Status: DISCONTINUED | OUTPATIENT
Start: 2023-06-24 | End: 2023-06-25 | Stop reason: HOSPADM

## 2023-06-24 RX ORDER — ALBUTEROL SULFATE 90 UG/1
1 AEROSOL, METERED RESPIRATORY (INHALATION) EVERY 4 HOURS PRN
Status: DISCONTINUED | OUTPATIENT
Start: 2023-06-24 | End: 2023-06-25 | Stop reason: HOSPADM

## 2023-06-24 RX ORDER — ACETAMINOPHEN 650 MG/1
650 SUPPOSITORY RECTAL EVERY 6 HOURS PRN
Status: DISCONTINUED | OUTPATIENT
Start: 2023-06-24 | End: 2023-06-25 | Stop reason: HOSPADM

## 2023-06-24 RX ADMIN — METOPROLOL SUCCINATE 25 MG: 25 TABLET, EXTENDED RELEASE ORAL at 18:26

## 2023-06-24 RX ADMIN — SODIUM CHLORIDE 1000 ML: 9 INJECTION, SOLUTION INTRAVENOUS at 12:39

## 2023-06-24 RX ADMIN — SACUBITRIL AND VALSARTAN 1 TABLET: 24; 26 TABLET, FILM COATED ORAL at 21:31

## 2023-06-24 RX ADMIN — Medication 10 ML: at 21:30

## 2023-06-24 RX ADMIN — HYDROCODONE BITARTRATE AND ACETAMINOPHEN 1 TABLET: 5; 325 TABLET ORAL at 14:27

## 2023-06-24 RX ADMIN — ONDANSETRON 4 MG: 4 TABLET, ORALLY DISINTEGRATING ORAL at 14:27

## 2023-06-24 ASSESSMENT — ENCOUNTER SYMPTOMS
BACK PAIN: 0
VOICE CHANGE: 0
ABDOMINAL DISTENTION: 0
SORE THROAT: 0
SINUS PRESSURE: 0
SHORTNESS OF BREATH: 0
CONSTIPATION: 0
SINUS PAIN: 0
BLOOD IN STOOL: 0
RHINORRHEA: 0
EYE REDNESS: 0
NAUSEA: 1
ABDOMINAL PAIN: 0
PHOTOPHOBIA: 0
STRIDOR: 0
DIARRHEA: 0
EYE PAIN: 0
EYE DISCHARGE: 0
CHOKING: 0
FACIAL SWELLING: 0
CHEST TIGHTNESS: 0
EYE ITCHING: 0
VOMITING: 0
APNEA: 0
TROUBLE SWALLOWING: 0
COLOR CHANGE: 0
WHEEZING: 0
ALLERGIC/IMMUNOLOGIC NEGATIVE: 1
COUGH: 0

## 2023-06-24 ASSESSMENT — PAIN SCALES - GENERAL
PAINLEVEL_OUTOF10: 0
PAINLEVEL_OUTOF10: 3

## 2023-06-25 ENCOUNTER — HOSPITAL ENCOUNTER (INPATIENT)
Age: 43
LOS: 3 days | Discharge: HOME | DRG: 227 | End: 2023-06-28
Attending: INTERNAL MEDICINE | Admitting: INTERNAL MEDICINE
Payer: COMMERCIAL

## 2023-06-25 VITALS
BODY MASS INDEX: 30.49 KG/M2 | DIASTOLIC BLOOD PRESSURE: 80 MMHG | HEART RATE: 78 BPM | WEIGHT: 213 LBS | SYSTOLIC BLOOD PRESSURE: 121 MMHG | RESPIRATION RATE: 18 BRPM | OXYGEN SATURATION: 99 % | HEIGHT: 70 IN | TEMPERATURE: 97.9 F

## 2023-06-25 DIAGNOSIS — R07.2 PRECORDIAL PAIN: Primary | ICD-10-CM

## 2023-06-25 DIAGNOSIS — I47.20 VT (VENTRICULAR TACHYCARDIA) (HCC): ICD-10-CM

## 2023-06-25 PROBLEM — I42.9 CARDIOMYOPATHY (HCC): Status: ACTIVE | Noted: 2023-06-25

## 2023-06-25 LAB
AMPHETAMINES UR QL SCN>500 NG/ML: ABNORMAL
ANION GAP SERPL CALCULATED.3IONS-SCNC: 8 MEQ/L (ref 9–15)
BARBITURATES UR QL SCN>200 NG/ML: ABNORMAL
BASOPHILS # BLD: 0 K/UL (ref 0–0.1)
BASOPHILS NFR BLD: 0.4 % (ref 0.2–1.2)
BENZODIAZ UR QL SCN: ABNORMAL
BILIRUB UR QL STRIP: NEGATIVE
BUN SERPL-MCNC: 15 MG/DL (ref 6–20)
CALCIUM SERPL-MCNC: 8.8 MG/DL (ref 8.5–9.9)
CHLORIDE SERPL-SCNC: 106 MEQ/L (ref 95–107)
CLARITY UR: CLEAR
CO2 SERPL-SCNC: 24 MEQ/L (ref 20–31)
COCAINE UR QL SCN: ABNORMAL
COLOR UR: YELLOW
CREAT SERPL-MCNC: 0.88 MG/DL (ref 0.7–1.2)
DRUG SCREEN COMMENT UR-IMP: ABNORMAL
EOSINOPHIL # BLD: 0.1 K/UL (ref 0–0.5)
EOSINOPHIL NFR BLD: 1.1 % (ref 0.8–7)
ERYTHROCYTE [DISTWIDTH] IN BLOOD BY AUTOMATED COUNT: 12.2 % (ref 11.6–14.4)
GLUCOSE SERPL-MCNC: 115 MG/DL (ref 70–99)
GLUCOSE UR STRIP-MCNC: NEGATIVE MG/DL
HCT VFR BLD AUTO: 38.8 % (ref 42–52)
HGB BLD-MCNC: 13.7 G/DL (ref 13.7–17.5)
HGB UR QL STRIP: NEGATIVE
IMM GRANULOCYTES # BLD: 0 K/UL
IMM GRANULOCYTES NFR BLD: 0.1 %
KETONES UR STRIP-MCNC: NEGATIVE MG/DL
LEUKOCYTE ESTERASE UR QL STRIP: NEGATIVE
LYMPHOCYTES # BLD: 2.1 K/UL (ref 1.3–3.6)
LYMPHOCYTES NFR BLD: 29.7 %
MCH RBC QN AUTO: 33.1 PG (ref 25.7–32.2)
MCHC RBC AUTO-ENTMCNC: 35.3 % (ref 32.3–36.5)
MCV RBC AUTO: 93.7 FL (ref 79–92.2)
MONOCYTES # BLD: 0.6 K/UL (ref 0.3–0.8)
MONOCYTES NFR BLD: 8 % (ref 5.3–12.2)
NEUTROPHILS # BLD: 4.3 K/UL (ref 1.8–5.4)
NEUTS SEG NFR BLD: 60.7 % (ref 34–67.9)
NITRITE UR QL STRIP: NEGATIVE
OPIATES UR QL SCN: POSITIVE
PCP UR QL SCN>25 NG/ML: ABNORMAL
PH UR STRIP: 6 [PH] (ref 5–9)
PLATELET # BLD AUTO: 193 K/UL (ref 163–337)
POTASSIUM SERPL-SCNC: 3.9 MEQ/L (ref 3.4–4.9)
PROT UR STRIP-MCNC: NEGATIVE MG/DL
RBC # BLD AUTO: 4.14 M/UL (ref 4.63–6.08)
SODIUM SERPL-SCNC: 138 MEQ/L (ref 135–144)
SP GR UR STRIP: 1.02 (ref 1–1.03)
THC UR QL SCN>50 NG/ML: POSITIVE
TROPONIN T SERPL-MCNC: <0.01 NG/ML (ref 0–0.01)
URINE REFLEX TO CULTURE: NORMAL
UROBILINOGEN UR STRIP-ACNC: 0.2 E.U./DL
WBC # BLD AUTO: 7.2 K/UL (ref 4.2–9)

## 2023-06-25 PROCEDURE — 96372 THER/PROPH/DIAG INJ SC/IM: CPT

## 2023-06-25 PROCEDURE — 6360000002 HC RX W HCPCS: Performed by: INTERNAL MEDICINE

## 2023-06-25 PROCEDURE — 2580000003 HC RX 258: Performed by: INTERNAL MEDICINE

## 2023-06-25 PROCEDURE — 94664 DEMO&/EVAL PT USE INHALER: CPT

## 2023-06-25 PROCEDURE — G0378 HOSPITAL OBSERVATION PER HR: HCPCS

## 2023-06-25 PROCEDURE — 36415 COLL VENOUS BLD VENIPUNCTURE: CPT

## 2023-06-25 PROCEDURE — 85025 COMPLETE CBC W/AUTO DIFF WBC: CPT

## 2023-06-25 PROCEDURE — 6370000000 HC RX 637 (ALT 250 FOR IP): Performed by: NURSE PRACTITIONER

## 2023-06-25 PROCEDURE — 81003 URINALYSIS AUTO W/O SCOPE: CPT

## 2023-06-25 PROCEDURE — 99222 1ST HOSP IP/OBS MODERATE 55: CPT | Performed by: INTERNAL MEDICINE

## 2023-06-25 PROCEDURE — 80048 BASIC METABOLIC PNL TOTAL CA: CPT

## 2023-06-25 PROCEDURE — 1210000000 HC MED SURG R&B

## 2023-06-25 PROCEDURE — 6370000000 HC RX 637 (ALT 250 FOR IP): Performed by: INTERNAL MEDICINE

## 2023-06-25 PROCEDURE — 84484 ASSAY OF TROPONIN QUANT: CPT

## 2023-06-25 PROCEDURE — 80306 DRUG TEST PRSMV INSTRMNT: CPT

## 2023-06-25 RX ORDER — LANOLIN ALCOHOL/MO/W.PET/CERES
400 CREAM (GRAM) TOPICAL 2 TIMES DAILY
Status: DISCONTINUED | OUTPATIENT
Start: 2023-06-25 | End: 2023-06-25 | Stop reason: HOSPADM

## 2023-06-25 RX ORDER — POTASSIUM CHLORIDE 1.5 G/1.77G
20 POWDER, FOR SOLUTION ORAL 2 TIMES DAILY
COMMUNITY

## 2023-06-25 RX ORDER — ONDANSETRON 4 MG/1
4 TABLET, ORALLY DISINTEGRATING ORAL EVERY 8 HOURS PRN
Status: CANCELLED | OUTPATIENT
Start: 2023-06-25

## 2023-06-25 RX ORDER — SODIUM CHLORIDE 9 MG/ML
INJECTION, SOLUTION INTRAVENOUS PRN
Status: CANCELLED | OUTPATIENT
Start: 2023-06-25

## 2023-06-25 RX ORDER — ONDANSETRON 2 MG/ML
4 INJECTION INTRAMUSCULAR; INTRAVENOUS EVERY 6 HOURS PRN
Status: CANCELLED | OUTPATIENT
Start: 2023-06-25

## 2023-06-25 RX ORDER — VALACYCLOVIR HYDROCHLORIDE 500 MG/1
500 TABLET, FILM COATED ORAL DAILY
Status: CANCELLED | OUTPATIENT
Start: 2023-06-26

## 2023-06-25 RX ORDER — NICOTINE 21 MG/24HR
1 PATCH, TRANSDERMAL 24 HOURS TRANSDERMAL DAILY
Status: DISCONTINUED | OUTPATIENT
Start: 2023-06-26 | End: 2023-06-28 | Stop reason: HOSPADM

## 2023-06-25 RX ORDER — ALBUTEROL SULFATE 90 UG/1
1 AEROSOL, METERED RESPIRATORY (INHALATION) EVERY 4 HOURS PRN
Status: CANCELLED | OUTPATIENT
Start: 2023-06-25

## 2023-06-25 RX ORDER — SPIRONOLACTONE 25 MG/1
25 TABLET ORAL DAILY
COMMUNITY

## 2023-06-25 RX ORDER — ACETAMINOPHEN 650 MG/1
650 SUPPOSITORY RECTAL EVERY 6 HOURS PRN
Status: CANCELLED | OUTPATIENT
Start: 2023-06-25

## 2023-06-25 RX ORDER — POLYETHYLENE GLYCOL 3350 17 G/17G
17 POWDER, FOR SOLUTION ORAL DAILY PRN
Status: DISCONTINUED | OUTPATIENT
Start: 2023-06-25 | End: 2023-06-28 | Stop reason: HOSPADM

## 2023-06-25 RX ORDER — SODIUM CHLORIDE 0.9 % (FLUSH) 0.9 %
5-40 SYRINGE (ML) INJECTION PRN
Status: CANCELLED | OUTPATIENT
Start: 2023-06-25

## 2023-06-25 RX ORDER — POLYETHYLENE GLYCOL 3350 17 G/17G
17 POWDER, FOR SOLUTION ORAL DAILY PRN
Status: CANCELLED | OUTPATIENT
Start: 2023-06-25

## 2023-06-25 RX ORDER — METOPROLOL SUCCINATE 50 MG/1
50 TABLET, EXTENDED RELEASE ORAL DAILY
Status: DISCONTINUED | OUTPATIENT
Start: 2023-06-26 | End: 2023-06-28 | Stop reason: HOSPADM

## 2023-06-25 RX ORDER — ONDANSETRON 2 MG/ML
4 INJECTION INTRAMUSCULAR; INTRAVENOUS EVERY 6 HOURS PRN
Status: DISCONTINUED | OUTPATIENT
Start: 2023-06-25 | End: 2023-06-27 | Stop reason: SDUPTHER

## 2023-06-25 RX ORDER — FUROSEMIDE 20 MG/1
20 TABLET ORAL DAILY
Status: CANCELLED | OUTPATIENT
Start: 2023-06-26

## 2023-06-25 RX ORDER — METOPROLOL SUCCINATE 50 MG/1
50 TABLET, EXTENDED RELEASE ORAL DAILY
COMMUNITY

## 2023-06-25 RX ORDER — ACETAMINOPHEN 650 MG/1
650 SUPPOSITORY RECTAL EVERY 6 HOURS PRN
Status: DISCONTINUED | OUTPATIENT
Start: 2023-06-25 | End: 2023-06-28 | Stop reason: HOSPADM

## 2023-06-25 RX ORDER — ACYCLOVIR 400 MG/1
800 TABLET ORAL DAILY
Status: DISCONTINUED | OUTPATIENT
Start: 2023-06-26 | End: 2023-06-28 | Stop reason: HOSPADM

## 2023-06-25 RX ORDER — LANOLIN ALCOHOL/MO/W.PET/CERES
400 CREAM (GRAM) TOPICAL 2 TIMES DAILY
Status: CANCELLED | OUTPATIENT
Start: 2023-06-25

## 2023-06-25 RX ORDER — ONDANSETRON 4 MG/1
4 TABLET, ORALLY DISINTEGRATING ORAL EVERY 8 HOURS PRN
Status: DISCONTINUED | OUTPATIENT
Start: 2023-06-25 | End: 2023-06-27 | Stop reason: SDUPTHER

## 2023-06-25 RX ORDER — METOPROLOL SUCCINATE 50 MG/1
50 TABLET, EXTENDED RELEASE ORAL DAILY
Status: DISCONTINUED | OUTPATIENT
Start: 2023-06-26 | End: 2023-06-25 | Stop reason: HOSPADM

## 2023-06-25 RX ORDER — ALBUTEROL SULFATE 90 UG/1
1 AEROSOL, METERED RESPIRATORY (INHALATION) EVERY 4 HOURS PRN
Status: DISCONTINUED | OUTPATIENT
Start: 2023-06-25 | End: 2023-06-28 | Stop reason: HOSPADM

## 2023-06-25 RX ORDER — ACETAMINOPHEN 325 MG/1
650 TABLET ORAL EVERY 6 HOURS PRN
Status: CANCELLED | OUTPATIENT
Start: 2023-06-25

## 2023-06-25 RX ORDER — ENOXAPARIN SODIUM 100 MG/ML
40 INJECTION SUBCUTANEOUS DAILY
Status: CANCELLED | OUTPATIENT
Start: 2023-06-26

## 2023-06-25 RX ORDER — LANOLIN ALCOHOL/MO/W.PET/CERES
400 CREAM (GRAM) TOPICAL 2 TIMES DAILY
Status: DISCONTINUED | OUTPATIENT
Start: 2023-06-25 | End: 2023-06-28 | Stop reason: HOSPADM

## 2023-06-25 RX ORDER — NICOTINE 21 MG/24HR
1 PATCH, TRANSDERMAL 24 HOURS TRANSDERMAL DAILY
Status: CANCELLED | OUTPATIENT
Start: 2023-06-26

## 2023-06-25 RX ORDER — ENOXAPARIN SODIUM 100 MG/ML
40 INJECTION SUBCUTANEOUS DAILY
Status: DISCONTINUED | OUTPATIENT
Start: 2023-06-26 | End: 2023-06-27

## 2023-06-25 RX ORDER — FUROSEMIDE 20 MG/1
20 TABLET ORAL DAILY
Status: DISCONTINUED | OUTPATIENT
Start: 2023-06-26 | End: 2023-06-28 | Stop reason: HOSPADM

## 2023-06-25 RX ORDER — SODIUM CHLORIDE 9 MG/ML
INJECTION, SOLUTION INTRAVENOUS PRN
Status: DISCONTINUED | OUTPATIENT
Start: 2023-06-25 | End: 2023-06-28 | Stop reason: HOSPADM

## 2023-06-25 RX ORDER — SODIUM CHLORIDE 0.9 % (FLUSH) 0.9 %
5-40 SYRINGE (ML) INJECTION PRN
Status: DISCONTINUED | OUTPATIENT
Start: 2023-06-25 | End: 2023-06-28 | Stop reason: HOSPADM

## 2023-06-25 RX ORDER — SODIUM CHLORIDE 0.9 % (FLUSH) 0.9 %
5-40 SYRINGE (ML) INJECTION EVERY 12 HOURS SCHEDULED
Status: CANCELLED | OUTPATIENT
Start: 2023-06-25

## 2023-06-25 RX ORDER — SODIUM CHLORIDE 0.9 % (FLUSH) 0.9 %
5-40 SYRINGE (ML) INJECTION EVERY 12 HOURS SCHEDULED
Status: DISCONTINUED | OUTPATIENT
Start: 2023-06-25 | End: 2023-06-28 | Stop reason: HOSPADM

## 2023-06-25 RX ORDER — ACETAMINOPHEN 325 MG/1
650 TABLET ORAL EVERY 6 HOURS PRN
Status: DISCONTINUED | OUTPATIENT
Start: 2023-06-25 | End: 2023-06-28 | Stop reason: HOSPADM

## 2023-06-25 RX ORDER — METOPROLOL SUCCINATE 50 MG/1
50 TABLET, EXTENDED RELEASE ORAL DAILY
Status: CANCELLED | OUTPATIENT
Start: 2023-06-26

## 2023-06-25 RX ADMIN — Medication 400 MG: at 09:26

## 2023-06-25 RX ADMIN — Medication 400 MG: at 20:51

## 2023-06-25 RX ADMIN — ENOXAPARIN SODIUM 40 MG: 100 INJECTION SUBCUTANEOUS at 09:21

## 2023-06-25 RX ADMIN — SACUBITRIL AND VALSARTAN 1 TABLET: 24; 26 TABLET, FILM COATED ORAL at 20:50

## 2023-06-25 RX ADMIN — Medication 10 ML: at 20:52

## 2023-06-25 ASSESSMENT — ENCOUNTER SYMPTOMS
SHORTNESS OF BREATH: 0
EYES NEGATIVE: 1
VOMITING: 0
ABDOMINAL PAIN: 0
WHEEZING: 0
NAUSEA: 0
GASTROINTESTINAL NEGATIVE: 1
ALLERGIC/IMMUNOLOGIC NEGATIVE: 1

## 2023-06-25 ASSESSMENT — PAIN SCALES - GENERAL
PAINLEVEL_OUTOF10: 0
PAINLEVEL_OUTOF10: 0

## 2023-06-26 PROBLEM — I47.20 VT (VENTRICULAR TACHYCARDIA) (HCC): Status: ACTIVE | Noted: 2023-06-25

## 2023-06-26 LAB
LV EF: 20 %
LVEF MODALITY: NORMAL

## 2023-06-26 PROCEDURE — 6360000002 HC RX W HCPCS: Performed by: INTERNAL MEDICINE

## 2023-06-26 PROCEDURE — 99222 1ST HOSP IP/OBS MODERATE 55: CPT | Performed by: INTERNAL MEDICINE

## 2023-06-26 PROCEDURE — 93308 TTE F-UP OR LMTD: CPT

## 2023-06-26 PROCEDURE — 2580000003 HC RX 258: Performed by: INTERNAL MEDICINE

## 2023-06-26 PROCEDURE — 6370000000 HC RX 637 (ALT 250 FOR IP): Performed by: INTERNAL MEDICINE

## 2023-06-26 PROCEDURE — 2060000000 HC ICU INTERMEDIATE R&B

## 2023-06-26 PROCEDURE — 6360000004 HC RX CONTRAST MEDICATION: Performed by: INTERNAL MEDICINE

## 2023-06-26 RX ORDER — MORPHINE SULFATE 4 MG/ML
4 INJECTION, SOLUTION INTRAMUSCULAR; INTRAVENOUS EVERY 4 HOURS PRN
Status: DISCONTINUED | OUTPATIENT
Start: 2023-06-26 | End: 2023-06-28 | Stop reason: HOSPADM

## 2023-06-26 RX ADMIN — Medication 10 ML: at 21:10

## 2023-06-26 RX ADMIN — ENOXAPARIN SODIUM 40 MG: 100 INJECTION SUBCUTANEOUS at 09:19

## 2023-06-26 RX ADMIN — Medication 10 ML: at 09:22

## 2023-06-26 RX ADMIN — METOPROLOL SUCCINATE 50 MG: 50 TABLET, EXTENDED RELEASE ORAL at 09:19

## 2023-06-26 RX ADMIN — SACUBITRIL AND VALSARTAN 1 TABLET: 24; 26 TABLET, FILM COATED ORAL at 09:19

## 2023-06-26 RX ADMIN — FUROSEMIDE 20 MG: 20 TABLET ORAL at 09:18

## 2023-06-26 RX ADMIN — MORPHINE SULFATE 4 MG: 4 INJECTION, SOLUTION INTRAMUSCULAR; INTRAVENOUS at 23:20

## 2023-06-26 RX ADMIN — PERFLUTREN 1.5 ML: 6.52 INJECTION, SUSPENSION INTRAVENOUS at 09:20

## 2023-06-26 RX ADMIN — ACYCLOVIR 800 MG: 400 TABLET ORAL at 09:18

## 2023-06-26 RX ADMIN — SACUBITRIL AND VALSARTAN 1 TABLET: 24; 26 TABLET, FILM COATED ORAL at 21:10

## 2023-06-26 RX ADMIN — Medication 400 MG: at 21:10

## 2023-06-26 RX ADMIN — Medication 400 MG: at 09:18

## 2023-06-26 RX ADMIN — ACETAMINOPHEN 650 MG: 325 TABLET ORAL at 21:10

## 2023-06-26 ASSESSMENT — PAIN DESCRIPTION - LOCATION
LOCATION: EAR;HEAD
LOCATION: HEAD

## 2023-06-26 ASSESSMENT — PAIN SCALES - GENERAL
PAINLEVEL_OUTOF10: 8
PAINLEVEL_OUTOF10: 8
PAINLEVEL_OUTOF10: 4
PAINLEVEL_OUTOF10: 8

## 2023-06-26 ASSESSMENT — PAIN DESCRIPTION - ORIENTATION: ORIENTATION: LEFT

## 2023-06-26 ASSESSMENT — PAIN DESCRIPTION - DESCRIPTORS
DESCRIPTORS: PRESSURE
DESCRIPTORS: POUNDING

## 2023-06-27 ENCOUNTER — APPOINTMENT (OUTPATIENT)
Dept: CARDIAC CATH/INVASIVE PROCEDURES | Age: 43
DRG: 227 | End: 2023-06-27
Attending: INTERNAL MEDICINE
Payer: COMMERCIAL

## 2023-06-27 ENCOUNTER — APPOINTMENT (OUTPATIENT)
Dept: GENERAL RADIOLOGY | Age: 43
DRG: 227 | End: 2023-06-27
Attending: INTERNAL MEDICINE
Payer: COMMERCIAL

## 2023-06-27 PROCEDURE — 0JH608Z INSERTION OF DEFIBRILLATOR GENERATOR INTO CHEST SUBCUTANEOUS TISSUE AND FASCIA, OPEN APPROACH: ICD-10-PCS | Performed by: INTERNAL MEDICINE

## 2023-06-27 PROCEDURE — 2060000000 HC ICU INTERMEDIATE R&B

## 2023-06-27 PROCEDURE — 6360000002 HC RX W HCPCS

## 2023-06-27 PROCEDURE — 33225 L VENTRIC PACING LEAD ADD-ON: CPT

## 2023-06-27 PROCEDURE — 2709999900 HC NON-CHARGEABLE SUPPLY

## 2023-06-27 PROCEDURE — 02H63KZ INSERTION OF DEFIBRILLATOR LEAD INTO RIGHT ATRIUM, PERCUTANEOUS APPROACH: ICD-10-PCS | Performed by: INTERNAL MEDICINE

## 2023-06-27 PROCEDURE — 33249 INSJ/RPLCMT DEFIB W/LEAD(S): CPT

## 2023-06-27 PROCEDURE — C1721 AICD, DUAL CHAMBER: HCPCS

## 2023-06-27 PROCEDURE — 71045 X-RAY EXAM CHEST 1 VIEW: CPT

## 2023-06-27 PROCEDURE — 2580000003 HC RX 258

## 2023-06-27 PROCEDURE — 2500000003 HC RX 250 WO HCPCS

## 2023-06-27 PROCEDURE — 6370000000 HC RX 637 (ALT 250 FOR IP): Performed by: NURSE PRACTITIONER

## 2023-06-27 PROCEDURE — 2580000003 HC RX 258: Performed by: INTERNAL MEDICINE

## 2023-06-27 PROCEDURE — C1892 INTRO/SHEATH,FIXED,PEEL-AWAY: HCPCS

## 2023-06-27 PROCEDURE — 6370000000 HC RX 637 (ALT 250 FOR IP): Performed by: INTERNAL MEDICINE

## 2023-06-27 PROCEDURE — 6360000002 HC RX W HCPCS: Performed by: INTERNAL MEDICINE

## 2023-06-27 PROCEDURE — C1898 LEAD, PMKR, OTHER THAN TRANS: HCPCS

## 2023-06-27 PROCEDURE — 99233 SBSQ HOSP IP/OBS HIGH 50: CPT | Performed by: INTERNAL MEDICINE

## 2023-06-27 PROCEDURE — C1777 LEAD, AICD, ENDO SINGLE COIL: HCPCS

## 2023-06-27 PROCEDURE — 02HK3KZ INSERTION OF DEFIBRILLATOR LEAD INTO RIGHT VENTRICLE, PERCUTANEOUS APPROACH: ICD-10-PCS | Performed by: INTERNAL MEDICINE

## 2023-06-27 RX ORDER — SODIUM CHLORIDE 9 MG/ML
INJECTION, SOLUTION INTRAVENOUS PRN
Status: DISCONTINUED | OUTPATIENT
Start: 2023-06-27 | End: 2023-06-28 | Stop reason: HOSPADM

## 2023-06-27 RX ORDER — ONDANSETRON 2 MG/ML
4 INJECTION INTRAMUSCULAR; INTRAVENOUS EVERY 6 HOURS PRN
Status: DISCONTINUED | OUTPATIENT
Start: 2023-06-27 | End: 2023-06-28 | Stop reason: HOSPADM

## 2023-06-27 RX ORDER — ACETAMINOPHEN 325 MG/1
650 TABLET ORAL EVERY 4 HOURS PRN
Status: DISCONTINUED | OUTPATIENT
Start: 2023-06-27 | End: 2023-06-28 | Stop reason: HOSPADM

## 2023-06-27 RX ORDER — CHLORHEXIDINE GLUCONATE 4 G/100ML
SOLUTION TOPICAL ONCE
Status: DISCONTINUED | OUTPATIENT
Start: 2023-06-27 | End: 2023-06-27

## 2023-06-27 RX ORDER — AMOXICILLIN 500 MG/1
1000 CAPSULE ORAL EVERY 12 HOURS SCHEDULED
Status: DISCONTINUED | OUTPATIENT
Start: 2023-06-27 | End: 2023-06-28 | Stop reason: HOSPADM

## 2023-06-27 RX ORDER — SODIUM CHLORIDE 0.9 % (FLUSH) 0.9 %
5-40 SYRINGE (ML) INJECTION EVERY 12 HOURS SCHEDULED
Status: DISCONTINUED | OUTPATIENT
Start: 2023-06-27 | End: 2023-06-27

## 2023-06-27 RX ORDER — ONDANSETRON 4 MG/1
4 TABLET, ORALLY DISINTEGRATING ORAL EVERY 8 HOURS PRN
Status: DISCONTINUED | OUTPATIENT
Start: 2023-06-27 | End: 2023-06-28 | Stop reason: HOSPADM

## 2023-06-27 RX ORDER — SODIUM CHLORIDE 0.9 % (FLUSH) 0.9 %
5-40 SYRINGE (ML) INJECTION EVERY 12 HOURS SCHEDULED
Status: DISCONTINUED | OUTPATIENT
Start: 2023-06-27 | End: 2023-06-28 | Stop reason: HOSPADM

## 2023-06-27 RX ORDER — SODIUM CHLORIDE 9 MG/ML
INJECTION, SOLUTION INTRAVENOUS PRN
Status: DISCONTINUED | OUTPATIENT
Start: 2023-06-27 | End: 2023-06-27

## 2023-06-27 RX ORDER — OFLOXACIN 3 MG/ML
10 SOLUTION AURICULAR (OTIC) DAILY
Status: DISCONTINUED | OUTPATIENT
Start: 2023-06-27 | End: 2023-06-28 | Stop reason: HOSPADM

## 2023-06-27 RX ORDER — SODIUM CHLORIDE 0.9 % (FLUSH) 0.9 %
5-40 SYRINGE (ML) INJECTION PRN
Status: DISCONTINUED | OUTPATIENT
Start: 2023-06-27 | End: 2023-06-27

## 2023-06-27 RX ORDER — SODIUM CHLORIDE 0.9 % (FLUSH) 0.9 %
5-40 SYRINGE (ML) INJECTION PRN
Status: DISCONTINUED | OUTPATIENT
Start: 2023-06-27 | End: 2023-06-28 | Stop reason: HOSPADM

## 2023-06-27 RX ORDER — TRAMADOL HYDROCHLORIDE 50 MG/1
50 TABLET ORAL EVERY 6 HOURS PRN
Status: DISCONTINUED | OUTPATIENT
Start: 2023-06-27 | End: 2023-06-28 | Stop reason: HOSPADM

## 2023-06-27 RX ADMIN — METOPROLOL SUCCINATE 50 MG: 50 TABLET, EXTENDED RELEASE ORAL at 10:16

## 2023-06-27 RX ADMIN — VANCOMYCIN HYDROCHLORIDE 1000 MG: 1 INJECTION, POWDER, LYOPHILIZED, FOR SOLUTION INTRAVENOUS at 07:00

## 2023-06-27 RX ADMIN — SACUBITRIL AND VALSARTAN 1 TABLET: 24; 26 TABLET, FILM COATED ORAL at 20:15

## 2023-06-27 RX ADMIN — MORPHINE SULFATE 4 MG: 4 INJECTION, SOLUTION INTRAMUSCULAR; INTRAVENOUS at 20:15

## 2023-06-27 RX ADMIN — ACYCLOVIR 800 MG: 400 TABLET ORAL at 10:16

## 2023-06-27 RX ADMIN — TRAMADOL HYDROCHLORIDE 50 MG: 50 TABLET, COATED ORAL at 14:11

## 2023-06-27 RX ADMIN — Medication 400 MG: at 20:15

## 2023-06-27 RX ADMIN — SACUBITRIL AND VALSARTAN 1 TABLET: 24; 26 TABLET, FILM COATED ORAL at 10:16

## 2023-06-27 RX ADMIN — FUROSEMIDE 20 MG: 20 TABLET ORAL at 10:16

## 2023-06-27 RX ADMIN — OFLOXACIN 10 DROP: 3 SOLUTION AURICULAR (OTIC) at 22:42

## 2023-06-27 RX ADMIN — MORPHINE SULFATE 4 MG: 4 INJECTION, SOLUTION INTRAMUSCULAR; INTRAVENOUS at 10:39

## 2023-06-27 RX ADMIN — AMOXICILLIN 1000 MG: 500 CAPSULE ORAL at 22:42

## 2023-06-27 RX ADMIN — Medication 10 ML: at 20:17

## 2023-06-27 RX ADMIN — Medication 400 MG: at 10:16

## 2023-06-27 ASSESSMENT — PAIN DESCRIPTION - ORIENTATION
ORIENTATION: LEFT
ORIENTATION: LEFT;ANTERIOR

## 2023-06-27 ASSESSMENT — PAIN SCALES - GENERAL
PAINLEVEL_OUTOF10: 8
PAINLEVEL_OUTOF10: 9
PAINLEVEL_OUTOF10: 3

## 2023-06-27 ASSESSMENT — ENCOUNTER SYMPTOMS
RHINORRHEA: 0
FACIAL SWELLING: 0

## 2023-06-27 ASSESSMENT — PAIN DESCRIPTION - LOCATION
LOCATION: SHOULDER
LOCATION: HEAD;CHEST;INCISION

## 2023-06-27 ASSESSMENT — PAIN - FUNCTIONAL ASSESSMENT: PAIN_FUNCTIONAL_ASSESSMENT: ACTIVITIES ARE NOT PREVENTED

## 2023-06-27 ASSESSMENT — PAIN DESCRIPTION - DESCRIPTORS: DESCRIPTORS: ACHING;SORE

## 2023-06-28 ENCOUNTER — APPOINTMENT (OUTPATIENT)
Dept: GENERAL RADIOLOGY | Age: 43
DRG: 227 | End: 2023-06-28
Attending: INTERNAL MEDICINE
Payer: COMMERCIAL

## 2023-06-28 VITALS
SYSTOLIC BLOOD PRESSURE: 100 MMHG | HEART RATE: 96 BPM | DIASTOLIC BLOOD PRESSURE: 71 MMHG | HEIGHT: 70 IN | WEIGHT: 229 LBS | TEMPERATURE: 97.7 F | OXYGEN SATURATION: 100 % | BODY MASS INDEX: 32.78 KG/M2 | RESPIRATION RATE: 16 BRPM

## 2023-06-28 PROCEDURE — 6370000000 HC RX 637 (ALT 250 FOR IP): Performed by: INTERNAL MEDICINE

## 2023-06-28 PROCEDURE — 71046 X-RAY EXAM CHEST 2 VIEWS: CPT

## 2023-06-28 PROCEDURE — 93283 PRGRMG EVAL IMPLANTABLE DFB: CPT

## 2023-06-28 PROCEDURE — 6370000000 HC RX 637 (ALT 250 FOR IP): Performed by: NURSE PRACTITIONER

## 2023-06-28 PROCEDURE — 6360000002 HC RX W HCPCS: Performed by: INTERNAL MEDICINE

## 2023-06-28 PROCEDURE — 99239 HOSP IP/OBS DSCHRG MGMT >30: CPT | Performed by: INTERNAL MEDICINE

## 2023-06-28 PROCEDURE — 2580000003 HC RX 258: Performed by: INTERNAL MEDICINE

## 2023-06-28 RX ORDER — AMOXICILLIN 500 MG/1
1000 CAPSULE ORAL EVERY 12 HOURS SCHEDULED
Qty: 36 CAPSULE | Refills: 0 | Status: SHIPPED | OUTPATIENT
Start: 2023-06-28 | End: 2023-07-07

## 2023-06-28 RX ORDER — TRAMADOL HYDROCHLORIDE 50 MG/1
50 TABLET ORAL EVERY 6 HOURS PRN
Qty: 28 TABLET | Refills: 0 | Status: SHIPPED | OUTPATIENT
Start: 2023-06-28 | End: 2023-07-03

## 2023-06-28 RX ORDER — LANOLIN ALCOHOL/MO/W.PET/CERES
400 CREAM (GRAM) TOPICAL 2 TIMES DAILY
Qty: 30 TABLET | Refills: 3 | Status: SHIPPED | OUTPATIENT
Start: 2023-06-28

## 2023-06-28 RX ORDER — OFLOXACIN 3 MG/ML
10 SOLUTION AURICULAR (OTIC) DAILY
Qty: 2.5 ML | Refills: 0 | Status: SHIPPED | OUTPATIENT
Start: 2023-06-29 | End: 2023-07-04

## 2023-06-28 RX ADMIN — AMOXICILLIN 1000 MG: 500 CAPSULE ORAL at 08:22

## 2023-06-28 RX ADMIN — TRAMADOL HYDROCHLORIDE 50 MG: 50 TABLET, COATED ORAL at 08:38

## 2023-06-28 RX ADMIN — SACUBITRIL AND VALSARTAN 1 TABLET: 24; 26 TABLET, FILM COATED ORAL at 08:22

## 2023-06-28 RX ADMIN — SODIUM CHLORIDE, PRESERVATIVE FREE 10 ML: 5 INJECTION INTRAVENOUS at 08:22

## 2023-06-28 RX ADMIN — METOPROLOL SUCCINATE 50 MG: 50 TABLET, EXTENDED RELEASE ORAL at 08:22

## 2023-06-28 RX ADMIN — FUROSEMIDE 20 MG: 20 TABLET ORAL at 08:22

## 2023-06-28 RX ADMIN — OFLOXACIN 10 DROP: 3 SOLUTION AURICULAR (OTIC) at 08:34

## 2023-06-28 RX ADMIN — ACYCLOVIR 800 MG: 400 TABLET ORAL at 08:22

## 2023-06-28 RX ADMIN — VANCOMYCIN HYDROCHLORIDE 1000 MG: 1 INJECTION, POWDER, LYOPHILIZED, FOR SOLUTION INTRAVENOUS at 08:28

## 2023-06-28 RX ADMIN — Medication 400 MG: at 08:22

## 2023-06-28 ASSESSMENT — PAIN DESCRIPTION - LOCATION: LOCATION: CHEST

## 2023-06-28 ASSESSMENT — PAIN SCALES - GENERAL: PAINLEVEL_OUTOF10: 8

## 2023-06-29 ENCOUNTER — TELEPHONE (OUTPATIENT)
Dept: FAMILY MEDICINE CLINIC | Age: 43
End: 2023-06-29

## 2023-07-03 ENCOUNTER — TELEPHONE (OUTPATIENT)
Dept: CARDIOLOGY CLINIC | Age: 43
End: 2023-07-03

## 2023-07-03 NOTE — TELEPHONE ENCOUNTER
Pt wife, Letitia López calling for a work note for pt to return to work light duty on 7/5/2023. They want to pick letter today. Is it ok to create letter?     Pt # [phone]

## 2023-07-05 ENCOUNTER — TELEPHONE (OUTPATIENT)
Dept: CARDIOLOGY CLINIC | Age: 43
End: 2023-07-05

## 2023-07-05 NOTE — TELEPHONE ENCOUNTER
Pt needing another note stating he can return to work. It doesn't need to say light duty.  Please call  [phone]

## 2023-07-05 NOTE — TELEPHONE ENCOUNTER
Patients wife came into office to  letter requesting that the light duty is taken off the letter advise to Dr Collier People note \"No Lifting, Driving or Strenuous Excercise and No Driving for 2 Hotswap" I created a new letter with those notes and the date he would be able to go back to no restrictions at all. 7/12/23. His job is aware that he needs to work light duty but the union is pushing back due to the LIGHT duty restrictions on the letter. Advise that we will speak with Dr Julio Dunaway. [address] call when letter is ready if clear to remove light duty.

## 2023-07-13 ENCOUNTER — OFFICE VISIT (OUTPATIENT)
Dept: CARDIOLOGY CLINIC | Age: 43
End: 2023-07-13
Payer: COMMERCIAL

## 2023-07-13 VITALS
WEIGHT: 232.2 LBS | OXYGEN SATURATION: 96 % | HEART RATE: 91 BPM | SYSTOLIC BLOOD PRESSURE: 126 MMHG | BODY MASS INDEX: 33.32 KG/M2 | DIASTOLIC BLOOD PRESSURE: 70 MMHG

## 2023-07-13 DIAGNOSIS — I47.20 VT (VENTRICULAR TACHYCARDIA) (HCC): ICD-10-CM

## 2023-07-13 DIAGNOSIS — Z95.810 AICD (AUTOMATIC CARDIOVERTER/DEFIBRILLATOR) PRESENT: ICD-10-CM

## 2023-07-13 DIAGNOSIS — F10.10 ETOH ABUSE: ICD-10-CM

## 2023-07-13 DIAGNOSIS — Z72.0 TOBACCO ABUSE: ICD-10-CM

## 2023-07-13 DIAGNOSIS — I42.0 DILATED CARDIOMYOPATHY (HCC): Primary | ICD-10-CM

## 2023-07-13 PROBLEM — R07.9 CHEST PAIN: Status: RESOLVED | Noted: 2023-06-24 | Resolved: 2023-07-13

## 2023-07-13 PROCEDURE — 99213 OFFICE O/P EST LOW 20 MIN: CPT | Performed by: INTERNAL MEDICINE

## 2023-07-13 NOTE — PROGRESS NOTES
Chief Complaint   Patient presents with    Follow-Up from Hospital       6-25-23: Patient is a 40 y.o. male who presents with a chief complaint of syncope, palpitations, fluttering of his heart. . Patient is followed on a regular basis by СЕРГЕЙ Kelly - CNP. Patient with past medical history of substance abuse, alcohol abuse, ischemic arthropathy fraction of 21% presents with syncopal episode as well as a fluttering type symptoms. States he is following up with the [facility], he is compliant with his medication. Noted to have short runs of nonsustained VT overnight. He denies any chest pain or shortness of breath. Status post cardiac ablation in December 2022 with no significant CAD depression of 20%      7-13-23: Patient presents for initial medical evaluation. Patient is followed on a regular basis by СЕРГЕЙ Kelly - CNP. Patient status post hospitalization for fluttering type symptoms. Also presyncopal/syncopal episode. Was noted to have runs of nonsustained VT on telemetry. Status post repeat limited echocardiogram showing ejection fraction of 20% with global hypokinesis no LV apical thrombus. Dr Dilip Hinton. EP was consulted and AICD was placed. No defibrillator shocks. He states he is quit drinking alcohol  He was placed on magnesium oxide.   He was noted to have an ear infection and placed on antibiotics at that time by hospitalist.      Patient Active Problem List   Diagnosis    Pilonidal cyst with abscess    Scalp cyst    Dehiscence of operative wound    Lumbar radiculopathy    Herniated lumbar intervertebral disc    Heart failure ([address])    Respiratory failure (HCC)    Mediastinal lymphadenopathy    Shortness of breath    Centrilobular emphysema (HCC)    Bilateral pleural effusion    Tobacco abuse    Cardiomyopathy ([address])    VT (ventricular tachycardia) ([address])    ETOH abuse       Past Surgical History:   Procedure Laterality Date    CYST REMOVAL  2012    neck/back of head    OTHER

## 2023-08-03 ENCOUNTER — APPOINTMENT (OUTPATIENT)
Dept: GENERAL RADIOLOGY | Age: 43
End: 2023-08-03
Payer: COMMERCIAL

## 2023-08-03 ENCOUNTER — APPOINTMENT (OUTPATIENT)
Dept: CT IMAGING | Age: 43
End: 2023-08-03
Payer: COMMERCIAL

## 2023-08-03 ENCOUNTER — HOSPITAL ENCOUNTER (EMERGENCY)
Age: 43
Discharge: LEFT BEFORE DISCHARGE | End: 2023-08-03
Payer: COMMERCIAL

## 2023-08-03 VITALS
HEART RATE: 84 BPM | WEIGHT: 220 LBS | SYSTOLIC BLOOD PRESSURE: 105 MMHG | DIASTOLIC BLOOD PRESSURE: 75 MMHG | BODY MASS INDEX: 31.5 KG/M2 | RESPIRATION RATE: 18 BRPM | OXYGEN SATURATION: 98 % | TEMPERATURE: 98.3 F | HEIGHT: 70 IN

## 2023-08-03 DIAGNOSIS — R55 PRE-SYNCOPE: Primary | ICD-10-CM

## 2023-08-03 DIAGNOSIS — Z53.29 LEFT AGAINST MEDICAL ADVICE: ICD-10-CM

## 2023-08-03 LAB
ALBUMIN SERPL-MCNC: 4.7 G/DL (ref 3.5–4.6)
ALP SERPL-CCNC: 76 U/L (ref 35–104)
ALT SERPL-CCNC: 34 U/L (ref 0–41)
AMPHET UR QL SCN: ABNORMAL
ANION GAP SERPL CALCULATED.3IONS-SCNC: 11 MEQ/L (ref 9–15)
APTT PPP: 29.4 SEC (ref 24.4–36.8)
AST SERPL-CCNC: 26 U/L (ref 0–40)
BARBITURATES UR QL SCN: ABNORMAL
BASOPHILS # BLD: 0 K/UL (ref 0–0.2)
BASOPHILS NFR BLD: 0.4 %
BENZODIAZ UR QL SCN: ABNORMAL
BILIRUB SERPL-MCNC: 0.5 MG/DL (ref 0.2–0.7)
BNP BLD-MCNC: 270 PG/ML
BUN SERPL-MCNC: 21 MG/DL (ref 6–20)
CALCIUM SERPL-MCNC: 9.4 MG/DL (ref 8.5–9.9)
CANNABINOIDS UR QL SCN: POSITIVE
CHLORIDE SERPL-SCNC: 105 MEQ/L (ref 95–107)
CK SERPL-CCNC: 380 U/L (ref 0–190)
CO2 SERPL-SCNC: 23 MEQ/L (ref 20–31)
COCAINE UR QL SCN: ABNORMAL
CREAT SERPL-MCNC: 1.08 MG/DL (ref 0.7–1.2)
D DIMER PPP FEU-MCNC: 0.65 MG/L FEU (ref 0–0.5)
DRUG SCREEN COMMENT UR-IMP: ABNORMAL
EOSINOPHIL # BLD: 0.1 K/UL (ref 0–0.7)
EOSINOPHIL NFR BLD: 1.2 %
ERYTHROCYTE [DISTWIDTH] IN BLOOD BY AUTOMATED COUNT: 12.3 % (ref 11.5–14.5)
FENTANYL SCREEN, URINE: ABNORMAL
GLOBULIN SER CALC-MCNC: 2.5 G/DL (ref 2.3–3.5)
GLUCOSE SERPL-MCNC: 114 MG/DL (ref 70–99)
HCT VFR BLD AUTO: 42.9 % (ref 42–52)
HGB BLD-MCNC: 14.6 G/DL (ref 14–18)
INR PPP: 1
LYMPHOCYTES # BLD: 2.3 K/UL (ref 1–4.8)
LYMPHOCYTES NFR BLD: 31.9 %
MAGNESIUM SERPL-MCNC: 2.3 MG/DL (ref 1.7–2.4)
MCH RBC QN AUTO: 32.2 PG (ref 27–31.3)
MCHC RBC AUTO-ENTMCNC: 34.1 % (ref 33–37)
MCV RBC AUTO: 94.4 FL (ref 79–92.2)
METHADONE UR QL SCN: ABNORMAL
MONOCYTES # BLD: 0.5 K/UL (ref 0.2–0.8)
MONOCYTES NFR BLD: 6.8 %
NEUTROPHILS # BLD: 4.2 K/UL (ref 1.4–6.5)
NEUTS SEG NFR BLD: 59.7 %
OPIATES UR QL SCN: ABNORMAL
OXYCODONE UR QL SCN: ABNORMAL
PCP UR QL SCN: ABNORMAL
PLATELET # BLD AUTO: 246 K/UL (ref 130–400)
POTASSIUM SERPL-SCNC: 3.9 MEQ/L (ref 3.4–4.9)
PROPOXYPH UR QL SCN: ABNORMAL
PROT SERPL-MCNC: 7.2 G/DL (ref 6.3–8)
PROTHROMBIN TIME: 13.4 SEC (ref 12.3–14.9)
RBC # BLD AUTO: 4.54 M/UL (ref 4.7–6.1)
SODIUM SERPL-SCNC: 139 MEQ/L (ref 135–144)
TROPONIN T SERPL-MCNC: <0.01 NG/ML (ref 0–0.01)
TSH SERPL-MCNC: 0.78 UIU/ML (ref 0.44–3.86)
WBC # BLD AUTO: 7.1 K/UL (ref 4.8–10.8)

## 2023-08-03 PROCEDURE — 80053 COMPREHEN METABOLIC PANEL: CPT

## 2023-08-03 PROCEDURE — 93005 ELECTROCARDIOGRAM TRACING: CPT | Performed by: STUDENT IN AN ORGANIZED HEALTH CARE EDUCATION/TRAINING PROGRAM

## 2023-08-03 PROCEDURE — 36415 COLL VENOUS BLD VENIPUNCTURE: CPT

## 2023-08-03 PROCEDURE — 84443 ASSAY THYROID STIM HORMONE: CPT

## 2023-08-03 PROCEDURE — 71045 X-RAY EXAM CHEST 1 VIEW: CPT

## 2023-08-03 PROCEDURE — 85730 THROMBOPLASTIN TIME PARTIAL: CPT

## 2023-08-03 PROCEDURE — 83880 ASSAY OF NATRIURETIC PEPTIDE: CPT

## 2023-08-03 PROCEDURE — 82550 ASSAY OF CK (CPK): CPT

## 2023-08-03 PROCEDURE — 85025 COMPLETE CBC W/AUTO DIFF WBC: CPT

## 2023-08-03 PROCEDURE — 84484 ASSAY OF TROPONIN QUANT: CPT

## 2023-08-03 PROCEDURE — 85379 FIBRIN DEGRADATION QUANT: CPT

## 2023-08-03 PROCEDURE — 99285 EMERGENCY DEPT VISIT HI MDM: CPT

## 2023-08-03 PROCEDURE — 83735 ASSAY OF MAGNESIUM: CPT

## 2023-08-03 PROCEDURE — 6360000004 HC RX CONTRAST MEDICATION: Performed by: STUDENT IN AN ORGANIZED HEALTH CARE EDUCATION/TRAINING PROGRAM

## 2023-08-03 PROCEDURE — 85610 PROTHROMBIN TIME: CPT

## 2023-08-03 PROCEDURE — 71275 CT ANGIOGRAPHY CHEST: CPT

## 2023-08-03 PROCEDURE — 80307 DRUG TEST PRSMV CHEM ANLYZR: CPT

## 2023-08-03 PROCEDURE — 70450 CT HEAD/BRAIN W/O DYE: CPT

## 2023-08-03 RX ADMIN — IOPAMIDOL 75 ML: 612 INJECTION, SOLUTION INTRAVENOUS at 16:38

## 2023-08-03 ASSESSMENT — PAIN SCALES - GENERAL: PAINLEVEL_OUTOF10: 7

## 2023-08-03 ASSESSMENT — PAIN DESCRIPTION - DESCRIPTORS: DESCRIPTORS: PRESSURE;SQUEEZING

## 2023-08-03 ASSESSMENT — LIFESTYLE VARIABLES
HOW MANY STANDARD DRINKS CONTAINING ALCOHOL DO YOU HAVE ON A TYPICAL DAY: 1 OR 2
HOW OFTEN DO YOU HAVE A DRINK CONTAINING ALCOHOL: 2-4 TIMES A MONTH

## 2023-08-03 ASSESSMENT — PAIN - FUNCTIONAL ASSESSMENT: PAIN_FUNCTIONAL_ASSESSMENT: 0-10

## 2023-08-03 ASSESSMENT — PAIN DESCRIPTION - PAIN TYPE: TYPE: ACUTE PAIN

## 2023-08-03 ASSESSMENT — PAIN DESCRIPTION - LOCATION: LOCATION: CHEST

## 2023-08-03 NOTE — ED NOTES
Pt and wife updated at this time   Pt lying in bed and denies any new symptoms at this time      Phill Lucas RN  08/03/23 7773

## 2023-08-03 NOTE — ED NOTES
Followed up with Loreta Galvez on 8/3/2023 at 5:32 PM. Patient left the ED with a disposition of AMA on . Patient cited does not want to wait for CTA results. Pt states that he would not stay even if provider suggested as reason. Advised patient to follow up with a primary care physician or return to the Emergency Department if symptoms worsen. Pt is educated that if any of the previous symptoms return to return to ED. Pt states understanding and understands the risks of leaving without results.    NILSON Arcos RN  08/03/23 0613

## 2023-08-03 NOTE — ED NOTES
Lab sent at this time   Pt to [facility],[address], RN  08/03/23 0588

## 2023-08-03 NOTE — ED PROVIDER NOTES
RESULTS     EKG: All EKG's are interpreted by the Emergency Department Physician who either signs or Co-signs this chart in the absence of a cardiologist.    EKG shows NSR with HR 86, normal axis, normal intervals, no ST changes. Non specific T wave abnormality. No change compared to 6/24/23       RADIOLOGY:   Non-plain film images such as CT, Ultrasound and MRI are read by the radiologist. Plain radiographic images are visualized and preliminarily interpreted by the emergency physician with the below findings:        Interpretation per the Radiologist below, if available at the time of this note:    CTA CHEST W WO CONTRAST PE Eval   Final Result   Borderline cardiomegaly and small pericardial effusion. No visible pulmonary embolism. Small mediastinal and hilar lymph nodes. XR CHEST PORTABLE   Final Result   No acute radiographic abnormality         CT HEAD WO CONTRAST   Final Result   No acute intracranial abnormality. ED BEDSIDE ULTRASOUND:   Performed by ED Physician - none    LABS:  Labs Reviewed   CBC WITH AUTO DIFFERENTIAL - Abnormal; Notable for the following components:       Result Value    RBC 4.54 (*)     MCV 94.4 (*)     MCH 32.2 (*)     All other components within normal limits   COMPREHENSIVE METABOLIC PANEL - Abnormal; Notable for the following components:    Glucose 114 (*)     BUN 21 (*)     Albumin 4.7 (*)     All other components within normal limits   CK - Abnormal; Notable for the following components:     Total  (*)     All other components within normal limits   D-DIMER, QUANTITATIVE - Abnormal; Notable for the following components:    D-Dimer, Quant 0.65 (*)     All other components within normal limits    Narrative:     CALL  [facility] tel. [phone],  Dimer results called to and read back by Dede Mcclellan, 08/03/2023 16:04, by  [address] - Abnormal; Notable for the following components:    Cannabinoid Scrn, Ur POSITIVE (*)     All other

## 2023-08-03 NOTE — ED NOTES
1700 Pt continue to ask about results for ct scan at this time  Pt getting dressed and vitals taken at this time  Pt asking to leave at this time   Marielle AGUILAR updated at this time   1720 Pt educated that if he leave prior to results that he would need to sign out AMA per Marielle AGUILAR   Pt states that he would like to sign out [address] RN  08/03/23 8398

## 2023-08-03 NOTE — ED NOTES
Pt attempting to urinate at this time        Mary Douglass, RN  08/03/23 2020 Ara Perez RN  08/03/23 8993

## 2023-08-04 LAB
EKG ATRIAL RATE: 86 BPM
EKG P AXIS: 31 DEGREES
EKG P-R INTERVAL: 154 MS
EKG Q-T INTERVAL: 382 MS
EKG QRS DURATION: 98 MS
EKG QTC CALCULATION (BAZETT): 457 MS
EKG R AXIS: 3 DEGREES
EKG T AXIS: 1 DEGREES
EKG VENTRICULAR RATE: 86 BPM

## 2023-08-05 ASSESSMENT — ENCOUNTER SYMPTOMS
NAUSEA: 0
SHORTNESS OF BREATH: 0
WHEEZING: 0
ABDOMINAL PAIN: 0
PHOTOPHOBIA: 0
VOMITING: 0
COUGH: 0

## 2023-08-08 ENCOUNTER — TELEPHONE (OUTPATIENT)
Dept: CARDIOLOGY CLINIC | Age: 43
End: 2023-08-08

## 2023-08-08 NOTE — TELEPHONE ENCOUNTER
Pt family calling to find out if pt should continue to go to the ER if he keeps having dizzy spells and chest pain? Pt has pacemaker and defibrillator. Appt scheduled for 8/11/2023 with Dr Iban Bolanos.     Pt # [phone]    Trang's # [phone]

## 2023-09-03 PROBLEM — M54.16 LUMBAR RADICULAR PAIN: Status: ACTIVE | Noted: 2023-09-03

## 2023-09-03 PROBLEM — J98.8 RESPIRATORY TRACT INFECTION: Status: ACTIVE | Noted: 2023-09-03

## 2023-09-03 PROBLEM — M27.9: Status: ACTIVE | Noted: 2023-09-03

## 2023-09-03 PROBLEM — E66.811 CLASS 1 OBESITY WITH BODY MASS INDEX (BMI) OF 30.0 TO 30.9 IN ADULT: Status: ACTIVE | Noted: 2023-09-03

## 2023-09-03 PROBLEM — M54.50 LOW BACK PAIN: Status: ACTIVE | Noted: 2023-09-03

## 2023-09-03 PROBLEM — R05.9 COUGH IN ADULT: Status: ACTIVE | Noted: 2023-09-03

## 2023-09-03 PROBLEM — E66.9 CLASS 1 OBESITY WITH BODY MASS INDEX (BMI) OF 30.0 TO 30.9 IN ADULT: Status: ACTIVE | Noted: 2023-09-03

## 2023-09-03 PROBLEM — R55 SYNCOPE: Status: ACTIVE | Noted: 2023-09-03

## 2023-09-03 RX ORDER — SPIRONOLACTONE 25 MG/1
1 TABLET ORAL DAILY
COMMUNITY
End: 2023-10-12

## 2023-09-03 RX ORDER — FUROSEMIDE 20 MG/1
1 TABLET ORAL DAILY PRN
COMMUNITY
End: 2023-10-10 | Stop reason: ALTCHOICE

## 2023-09-03 RX ORDER — ALBUTEROL SULFATE 90 UG/1
1-2 AEROSOL, METERED RESPIRATORY (INHALATION)
COMMUNITY
Start: 2022-01-18

## 2023-09-03 RX ORDER — OXYCODONE AND ACETAMINOPHEN 5; 325 MG/1; MG/1
1-2 TABLET ORAL
COMMUNITY
Start: 2014-02-07 | End: 2023-10-10 | Stop reason: ALTCHOICE

## 2023-09-03 RX ORDER — CALCIUM CARBONATE 300MG(750)
400 TABLET,CHEWABLE ORAL 2 TIMES DAILY
COMMUNITY

## 2023-09-03 RX ORDER — CELECOXIB 200 MG/1
1 CAPSULE ORAL
COMMUNITY
Start: 2022-07-22 | End: 2023-10-12

## 2023-09-03 RX ORDER — BROMPHENIRAMINE MALEATE, PSEUDOEPHEDRINE HYDROCHLORIDE, AND DEXTROMETHORPHAN HYDROBROMIDE 2; 30; 10 MG/5ML; MG/5ML; MG/5ML
5 SYRUP ORAL
COMMUNITY
Start: 2022-01-18 | End: 2023-10-10 | Stop reason: ALTCHOICE

## 2023-09-03 RX ORDER — KETOROLAC TROMETHAMINE 10 MG/1
1 TABLET, FILM COATED ORAL EVERY 6 HOURS PRN
COMMUNITY
End: 2023-10-12

## 2023-09-03 RX ORDER — METOPROLOL SUCCINATE 50 MG/1
50 TABLET, EXTENDED RELEASE ORAL DAILY
COMMUNITY

## 2023-09-03 RX ORDER — DAPAGLIFLOZIN 10 MG/1
1 TABLET, FILM COATED ORAL EVERY MORNING
COMMUNITY

## 2023-09-03 RX ORDER — AMOXICILLIN 500 MG/1
2 CAPSULE ORAL 2 TIMES DAILY
COMMUNITY
End: 2023-10-10 | Stop reason: ALTCHOICE

## 2023-09-03 RX ORDER — POTASSIUM CHLORIDE 20 MEQ/1
20 TABLET, EXTENDED RELEASE ORAL DAILY PRN
COMMUNITY
End: 2023-10-12

## 2023-09-03 RX ORDER — TRAMADOL HYDROCHLORIDE 50 MG/1
1 TABLET ORAL EVERY 6 HOURS PRN
COMMUNITY
End: 2023-10-10 | Stop reason: ALTCHOICE

## 2023-09-26 ENCOUNTER — HOSPITAL ENCOUNTER (OUTPATIENT)
Dept: CARDIOLOGY | Age: 43
Discharge: HOME | End: 2023-09-26
Payer: COMMERCIAL

## 2023-09-26 PROCEDURE — 93296 REM INTERROG EVL PM/IDS: CPT

## 2023-10-10 ENCOUNTER — OFFICE VISIT (OUTPATIENT)
Dept: CARDIOLOGY | Facility: CLINIC | Age: 43
End: 2023-10-10
Payer: COMMERCIAL

## 2023-10-10 ENCOUNTER — LAB (OUTPATIENT)
Dept: LAB | Facility: LAB | Age: 43
End: 2023-10-10
Payer: COMMERCIAL

## 2023-10-10 VITALS
BODY MASS INDEX: 32.92 KG/M2 | HEART RATE: 93 BPM | DIASTOLIC BLOOD PRESSURE: 76 MMHG | SYSTOLIC BLOOD PRESSURE: 104 MMHG | WEIGHT: 236 LBS

## 2023-10-10 DIAGNOSIS — I50.22 CHF (CONGESTIVE HEART FAILURE), NYHA CLASS I, CHRONIC, SYSTOLIC (MULTI): Primary | ICD-10-CM

## 2023-10-10 DIAGNOSIS — I47.20 SUSTAINED VT (VENTRICULAR TACHYCARDIA) (MULTI): ICD-10-CM

## 2023-10-10 DIAGNOSIS — I50.20 SYSTOLIC CONGESTIVE HEART FAILURE, UNSPECIFIED HF CHRONICITY (MULTI): ICD-10-CM

## 2023-10-10 DIAGNOSIS — Z95.810 AICD (AUTOMATIC CARDIOVERTER/DEFIBRILLATOR) PRESENT: ICD-10-CM

## 2023-10-10 DIAGNOSIS — I42.8 NON-ISCHEMIC CARDIOMYOPATHY (MULTI): ICD-10-CM

## 2023-10-10 DIAGNOSIS — N17.9 AKI (ACUTE KIDNEY INJURY) (CMS-HCC): ICD-10-CM

## 2023-10-10 LAB
ANION GAP SERPL CALC-SCNC: 13 MMOL/L (ref 10–20)
BUN SERPL-MCNC: 18 MG/DL (ref 6–23)
CALCIUM SERPL-MCNC: 9.3 MG/DL (ref 8.6–10.3)
CHLORIDE SERPL-SCNC: 106 MMOL/L (ref 98–107)
CO2 SERPL-SCNC: 25 MMOL/L (ref 21–32)
CREAT SERPL-MCNC: 1.8 MG/DL (ref 0.5–1.3)
GFR SERPL CREATININE-BSD FRML MDRD: 48 ML/MIN/1.73M*2
GLUCOSE SERPL-MCNC: 92 MG/DL (ref 74–99)
MAGNESIUM SERPL-MCNC: 2.06 MG/DL (ref 1.6–2.4)
POTASSIUM SERPL-SCNC: 4.2 MMOL/L (ref 3.5–5.3)
SODIUM SERPL-SCNC: 140 MMOL/L (ref 136–145)

## 2023-10-10 PROCEDURE — 80048 BASIC METABOLIC PNL TOTAL CA: CPT

## 2023-10-10 PROCEDURE — 83735 ASSAY OF MAGNESIUM: CPT

## 2023-10-10 PROCEDURE — 36415 COLL VENOUS BLD VENIPUNCTURE: CPT

## 2023-10-10 PROCEDURE — 99214 OFFICE O/P EST MOD 30 MIN: CPT | Performed by: INTERNAL MEDICINE

## 2023-10-10 RX ORDER — TORSEMIDE 20 MG/1
1 TABLET ORAL DAILY PRN
COMMUNITY
Start: 2023-09-18 | End: 2023-10-12

## 2023-10-10 RX ORDER — VALACYCLOVIR HYDROCHLORIDE 500 MG/1
500 TABLET, FILM COATED ORAL 2 TIMES DAILY PRN
COMMUNITY
Start: 2023-08-21

## 2023-10-10 NOTE — PROGRESS NOTES
Patient:  Elmer Petersno  YOB: 1980  MRN: [id number]       Impression/Plan:     Diagnoses and all orders for this visit:  CHF (congestive heart failure), NYHA class I, chronic, systolic (CMS/HCC)  Non-ischemic cardiomyopathy (CMS/HCC)  -Remarkably well compensated despite profound LV dysfunction.  Tolerating maximal guideline directed medical therapy quite well.  Hopefully with abstinence from alcohol LV function will improve.  Continue current medical regimen.  -He was following in the office today primarily for his AICD and was inadvertently scheduled to see me.  I have reviewed his defibrillator discussed the case with Dr. Vanegas.  He does not need to see me regularly.  He has general cardiology and heart failure cardiology through [facility] cardiology and through [facility] cardiology.  -He must avoid alcohol completely.  I reviewed with him that alcohol is a likely cause of his cardiomyopathy.    AICD (automatic cardioverter/defibrillator) present  -Sustained VT (ventricular tachycardia) (CMS/HCC)  -He has had no symptoms of VT.  Recent device check showed an episode of VT terminated with antitachycardia pacing.  We will obtain electrolytes magnesium level to assure no contribution by metabolic abnormality.  I discussed the case with Dr. Umesh Vanegas we will see him in follow-up in the near future.  -Well-healed no evidence of infection    MICHAEL: The patient left the office laboratories were obtained electrolytes and magnesium were normal.  However creatinine which was previously normal is 1.8.  He will be contacted such that spironolactone potassium torsemide are discontinued.  He will also be told not to take any Celebrex Toradol or any other nonsteroidal anti-inflammatories repeat BMP in 1 week      Chief Complaint/Active Symptoms:       Elmer Peterson is a 42 y.o. male who presents with nonischemic cardiomyopathy first diagnosed December 2022.  Initial ejection fraction 10-15%.  Subsequent  coronary angiography January 2023 with no obstructive coronary disease identified.  Cardiac MRI March 2023 done at [facility] demonstrated 21% ejection fraction global hypokinesis no enhancement no evidence of interstitial fibrosis or infiltration.  RV size was normal with severe decrease in RV systolic function.  Echocardiogram June 2023 at [facility] 21% ejection fraction and seen by Dr. Vanegas of electrophysiology.  8/27/2023 AICD implant Medtronic cobalt XT device.  Patient does have a history of alcohol abuse.    He has followed at [facility] as recently as May of this year with Darren Rivero for his cardiomyopathy.  That is in the heart failure division of [facility].    AICD was implanted by Dr. Vanegas of [facility]    He has followed with general cardiology at [facility] Herlinda Sampson and [facility] as well.    September 2023 device check: Normal device and lead function.  1 sustained VT which antitachycardia pacing successfully terminated.    He denies angina, dyspnea, palpitation, edema, lightheadedness or syncope.  He has had no symptoms of claudication or neurologic deterioration.  There have been no hospitalizations or emergency room visits since last office visit.      He is no longer drinking excessive alcohol.  He has has a couple of drinks on occasion.  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                 Review of Systems: Unremarkable except as noted above    Meds     Current Outpatient Medications   Medication Instructions    albuterol 90 mcg/actuation inhaler 1-2 puffs, inhalation, EVERY 4 TO 6 HOURS AS NEEDED.    celecoxib (CeleBREX) 200 mg capsule 1 capsule, oral, Daily RT    dapagliflozin propanediol (Farxiga) 10 mg 1 tablet, oral, Every morning    ketorolac (Toradol) 10 mg tablet 1 tablet, oral, Every 6 hours PRN    magnesium oxide (MAG-OX) 400 mg, oral,  2 times daily    metoprolol succinate XL (TOPROL-XL) 50 mg, oral, Daily, Do not crush or chew.    potassium chloride CR 20 mEq ER tablet 20 mEq, oral, Daily PRN    sacubitriL-valsartan (Entresto) 49-51 mg tablet 1 tablet, oral, 2 times daily    spironolactone (Aldactone) 25 mg tablet 1 tablet, oral, Daily    torsemide (Demadex) 20 mg tablet 1 tablet, oral, Daily PRN    valACYclovir (VALTREX) 500 mg, oral, 2 times daily PRN        Allergies     Allergies   Allergen Reactions    Hydrocodone-Acetaminophen Unknown         Annotated Problems     Specialty Problems          Cardiology Problems    AICD (automatic cardioverter/defibrillator) present     8/27/2023 AICD implant Medtronic cobalt XT device         Non-ischemic cardiomyopathy (CMS/HCC)     Etiology felt probably EtOH induced  Cardiac MRI March 2023 done at [facility] demonstrated 21% ejection fraction global hypokinesis no enhancement no evidence of interstitial fibrosis or infiltration.  RV size was normal with severe decrease in RV systolic function.  Echocardiogram June 2023 at [facility] 21% ejection fraction         Systolic congestive heart failure (CMS/HCC)        Problem List     Patient Active Problem List    Diagnosis Date Noted    Non-ischemic cardiomyopathy (CMS/HCC) 10/10/2023    AICD (automatic cardioverter/defibrillator) present 10/10/2023    Systolic congestive heart failure (CMS/HCC) 10/10/2023    Cough in adult 09/03/2023    Low back pain 09/03/2023    Lumbar radicular pain 09/03/2023    Radiolucent lesion in maxilla 09/03/2023    Respiratory tract infection 09/03/2023    Class 1 obesity with body mass index (BMI) of 30.0 to 30.9 in adult 09/03/2023    Syncope 09/03/2023       Objective:     Vitals:    10/10/23 1556   BP: 104/76   BP Location: Right arm   Patient Position: Sitting   Pulse: 93   Weight: 107 kg (236 lb)      Wt Readings from Last 4 Encounters:   10/10/23 107 kg (236 lb)   07/07/23 99.8 kg (220 lb)    07/22/22 93 kg (205 lb)           LAB:   8/3/2023 proBNP 270 sodium 139 potassium 3.9 BUN 21 creatinine 1.08 normal liver function hemoglobin white count platelet count normal magnesium normal      Diagnostic Studies:     Patient was never admitted.      Radiology:     No orders to display       Physical Exam     General Appearance: alert and oriented to person, place and time, in no acute distress  Cardiovascular: normal rate, regular rhythm, normal S1 and S2, no murmurs, rubs, clicks, or gallops,  no JVD  Pulmonary/Chest: clear to auscultation bilaterally- no wheezes, rales or rhonchi, normal air movement, no respiratory distress  Abdomen: soft, non-tender, non-distended, normal bowel sounds, no masses   Extremities: no cyanosis, clubbing or edema  Skin: warm and dry, no rash or erythema  Eyes: EOMI  Neck: supple and non-tender without mass, no thyromegaly   Neurological: alert, oriented, normal speech, no focal findings or movement disorder noted  Vascular: 2+ pulses  AICD site: well healed        [unfilled]

## 2023-10-12 ENCOUNTER — TELEPHONE (OUTPATIENT)
Dept: CARDIOLOGY | Facility: CLINIC | Age: 43
End: 2023-10-12
Payer: COMMERCIAL

## 2023-10-12 DIAGNOSIS — I50.20 SYSTOLIC CONGESTIVE HEART FAILURE, UNSPECIFIED HF CHRONICITY (MULTI): ICD-10-CM

## 2023-10-12 NOTE — TELEPHONE ENCOUNTER
Vick Smith MD  [id number] [facility] Clinical Support Staff  Electrolytes and magnesium are normal but kidney function has significantly worsened since he was in the hospital.  Normal is 1.0 which it was when he left the hospital currently 1.8.  Needs to be sure he is maintaining adequate hydration.  Stop torsemide, spironolactone, potassium.  Be sure he is not taking any Toradol or Celebrex both of those can worsen kidney function.  Needs a repeat BMP in 1 week.

## 2023-11-16 ENCOUNTER — APPOINTMENT (OUTPATIENT)
Dept: CARDIOLOGY | Facility: CLINIC | Age: 43
End: 2023-11-16
Payer: COMMERCIAL

## 2023-12-27 ENCOUNTER — HOSPITAL ENCOUNTER (OUTPATIENT)
Dept: CARDIOLOGY | Age: 43
Discharge: HOME | End: 2023-12-27
Payer: COMMERCIAL

## 2023-12-27 PROCEDURE — 93295 DEV INTERROG REMOTE 1/2/MLT: CPT | Performed by: INTERNAL MEDICINE

## 2023-12-27 PROCEDURE — 93296 REM INTERROG EVL PM/IDS: CPT

## 2024-01-11 ENCOUNTER — OFFICE VISIT (OUTPATIENT)
Dept: CARDIOLOGY CLINIC | Age: 44
End: 2024-01-11

## 2024-01-11 VITALS
DIASTOLIC BLOOD PRESSURE: 72 MMHG | HEART RATE: 79 BPM | BODY MASS INDEX: 34.02 KG/M2 | WEIGHT: 243 LBS | HEIGHT: 71 IN | OXYGEN SATURATION: 98 % | SYSTOLIC BLOOD PRESSURE: 112 MMHG

## 2024-01-11 DIAGNOSIS — Z72.0 TOBACCO ABUSE: ICD-10-CM

## 2024-01-11 DIAGNOSIS — I50.22 CHRONIC SYSTOLIC (CONGESTIVE) HEART FAILURE (HCC): ICD-10-CM

## 2024-01-11 DIAGNOSIS — I50.9 HEART FAILURE, UNSPECIFIED HF CHRONICITY, UNSPECIFIED HEART FAILURE TYPE (HCC): ICD-10-CM

## 2024-01-11 DIAGNOSIS — R06.02 SHORTNESS OF BREATH: ICD-10-CM

## 2024-01-11 DIAGNOSIS — Z95.810 AICD (AUTOMATIC CARDIOVERTER/DEFIBRILLATOR) PRESENT: Primary | ICD-10-CM

## 2024-01-11 DIAGNOSIS — F10.10 ETOH ABUSE: ICD-10-CM

## 2024-01-11 DIAGNOSIS — I47.20 VT (VENTRICULAR TACHYCARDIA) (HCC): ICD-10-CM

## 2024-01-11 DIAGNOSIS — I42.0 DILATED CARDIOMYOPATHY (HCC): ICD-10-CM

## 2024-01-11 RX ORDER — TORSEMIDE 20 MG/1
20 TABLET ORAL DAILY PRN
COMMUNITY
Start: 2023-09-18

## 2024-01-11 NOTE — PROGRESS NOTES
Chief Complaint   Patient presents with    Cardiomyopathy    6 Month Follow-Up       6-25-23: Patient is a 42 y.o. male who presents with a chief complaint of syncope, palpitations, fluttering of his heart. . Patient is followed on a regular basis by СЕРГЕЙ Crespo CNP.  Patient with past medical history of substance abuse, alcohol abuse, ischemic arthropathy fraction of 21% presents with syncopal episode as well as a fluttering type symptoms.  States he is following up with the [facility], he is compliant with his medication.  Noted to have short runs of nonsustained VT overnight.  He denies any chest pain or shortness of breath.  Status post cardiac catheterization in December 2022 with no significant CAD EF depression of 20%      7-13-23: Patient presents for initial medical evaluation. Patient is followed on a regular basis by Wanda Gonzalez APRN - CNP.   Patient status post hospitalization for fluttering type symptoms.  Also presyncopal/syncopal episode.  Was noted to have runs of nonsustained VT on telemetry.  Status post repeat limited echocardiogram showing ejection fraction of 20% with global hypokinesis no LV apical thrombus.  Dr Amador. EP was consulted and AICD was placed.  No defibrillator shocks.  He states he is quit drinking alcohol  He was placed on magnesium oxide.  He was noted to have an ear infection and placed on antibiotics at that time by hospitalist.    1-11-24: Patient was not able to wait for Dr. Amador and was not evaluated last appointment.  He has not had his defibrillator checked for some time.  He is doing okay overall.  He denies any defibrillator shocks.  History of AICD due to runs of nonsustained VT.  Status post echo ejection fraction of 20% with global hypokinesis, no LV apical thrombus.  Patient with history of nonischemic cardiomyopathy.  Cardiac catheterization in December 2022 with no significant CAD.  His medications were adjusted recently and taken off of magnesium

## 2024-01-24 ENCOUNTER — OFFICE VISIT (OUTPATIENT)
Dept: FAMILY MEDICINE CLINIC | Age: 44
End: 2024-01-24
Payer: COMMERCIAL

## 2024-01-24 VITALS
HEIGHT: 71 IN | OXYGEN SATURATION: 95 % | BODY MASS INDEX: 33.6 KG/M2 | WEIGHT: 240 LBS | SYSTOLIC BLOOD PRESSURE: 122 MMHG | HEART RATE: 98 BPM | DIASTOLIC BLOOD PRESSURE: 84 MMHG

## 2024-01-24 DIAGNOSIS — G44.209 ACUTE NON INTRACTABLE TENSION-TYPE HEADACHE: Primary | ICD-10-CM

## 2024-01-24 DIAGNOSIS — J43.2 CENTRILOBULAR EMPHYSEMA (HCC): ICD-10-CM

## 2024-01-24 PROCEDURE — 99213 OFFICE O/P EST LOW 20 MIN: CPT | Performed by: NURSE PRACTITIONER

## 2024-01-24 RX ORDER — SACUBITRIL AND VALSARTAN 49; 51 MG/1; MG/1
TABLET, FILM COATED ORAL
COMMUNITY
Start: 2023-11-29

## 2024-01-24 RX ORDER — POTASSIUM CHLORIDE 20 MEQ/1
20 TABLET, EXTENDED RELEASE ORAL 2 TIMES DAILY
COMMUNITY
Start: 2023-06-05

## 2024-01-24 RX ORDER — TIZANIDINE 4 MG/1
4 TABLET ORAL EVERY 8 HOURS PRN
Qty: 60 TABLET | Refills: 2 | Status: SHIPPED | OUTPATIENT
Start: 2024-01-24

## 2024-01-24 ASSESSMENT — ENCOUNTER SYMPTOMS
COUGH: 0
PHOTOPHOBIA: 0
SHORTNESS OF BREATH: 0

## 2024-01-24 ASSESSMENT — PATIENT HEALTH QUESTIONNAIRE - PHQ9
2. FEELING DOWN, DEPRESSED OR HOPELESS: 0
SUM OF ALL RESPONSES TO PHQ QUESTIONS 1-9: 0
SUM OF ALL RESPONSES TO PHQ9 QUESTIONS 1 & 2: 0
SUM OF ALL RESPONSES TO PHQ QUESTIONS 1-9: 0
1. LITTLE INTEREST OR PLEASURE IN DOING THINGS: 0
SUM OF ALL RESPONSES TO PHQ QUESTIONS 1-9: 0
SUM OF ALL RESPONSES TO PHQ QUESTIONS 1-9: 0

## 2024-01-24 NOTE — PROGRESS NOTES
Subjective  Chief Complaint   Patient presents with    Headache     Pt says he has been having daily headaches really bad, started a couple of weeks ago, with slight nausea. Tried ibuprofen        HPI    Headaches started a few weeks ago.  No recent changes with meds.  HA's are typically frontal.   Don't wake up with them.  Taking ibuprofen- 800 mg which helps.    Had all teeth pulled last week.   Seem worse since     Getting them a couple times a week.   No light or sound sensitivity.   HA more often in the evenings.  Not currently working outside the home.  Sleep- maybe a little more.   Diet- has put on a little work.     Not drinking alcohol much.     Patient Active Problem List    Diagnosis Date Noted    Mediastinal lymphadenopathy 01/01/2023    Shortness of breath 01/01/2023    Centrilobular emphysema (HCC) 01/01/2023    Bilateral pleural effusion 01/01/2023    Tobacco abuse 01/01/2023    Heart failure (HCC) 12/31/2022    Respiratory failure (HCC) 12/31/2022    Chronic systolic (congestive) heart failure 01/11/2024    ETOH abuse 07/13/2023    Cardiomyopathy (HCC) 06/25/2023    VT (ventricular tachycardia) (HCC) 06/25/2023    Lumbar radiculopathy 06/07/2018    Herniated lumbar intervertebral disc 06/07/2018    Dehiscence of operative wound 02/18/2013    Scalp cyst 01/25/2013    Pilonidal cyst with abscess 01/17/2013     Past Medical History:   Diagnosis Date    Centrilobular emphysema (HCC) 1/1/2023    Chronic systolic (congestive) heart failure 1/11/2024    ETOH abuse 7/13/2023    ETOH abuse 7/13/2023    Lumbar radiculopathy 6/7/2018     Past Surgical History:   Procedure Laterality Date    CYST REMOVAL  2012    neck/back of head    OTHER SURGICAL HISTORY  1/17/13    pilonidol cyst    OTHER SURGICAL HISTORY  3/4/13    Repair of scalp wound dehiscence    OTHER SURGICAL HISTORY  3/4/13    exploration of scalp wound with ligation of bleeder    WISDOM TOOTH EXTRACTION       Family History   Problem Relation Age of

## 2024-02-26 ENCOUNTER — HOSPITAL ENCOUNTER (EMERGENCY)
Age: 44
Discharge: HOME | End: 2024-02-26
Attending: EMERGENCY MEDICINE
Payer: COMMERCIAL

## 2024-02-26 ENCOUNTER — APPOINTMENT (OUTPATIENT)
Dept: GENERAL RADIOLOGY | Age: 44
End: 2024-02-26
Payer: COMMERCIAL

## 2024-02-26 VITALS
TEMPERATURE: 97.6 F | DIASTOLIC BLOOD PRESSURE: 95 MMHG | SYSTOLIC BLOOD PRESSURE: 132 MMHG | OXYGEN SATURATION: 96 % | HEIGHT: 70 IN | BODY MASS INDEX: 32.93 KG/M2 | HEART RATE: 76 BPM | RESPIRATION RATE: 17 BRPM | WEIGHT: 230 LBS

## 2024-02-26 DIAGNOSIS — R00.2 PALPITATIONS: Primary | ICD-10-CM

## 2024-02-26 LAB
ALBUMIN SERPL-MCNC: 4.3 G/DL (ref 3.5–4.6)
ALP SERPL-CCNC: 71 U/L (ref 35–104)
ALT SERPL-CCNC: 21 U/L (ref 0–41)
ANION GAP SERPL CALCULATED.3IONS-SCNC: 11 MEQ/L (ref 9–15)
AST SERPL-CCNC: 16 U/L (ref 0–40)
BASOPHILS # BLD: 0 K/UL (ref 0–0.2)
BASOPHILS NFR BLD: 0.4 %
BILIRUB SERPL-MCNC: 0.4 MG/DL (ref 0.2–0.7)
BUN SERPL-MCNC: 15 MG/DL (ref 6–20)
CALCIUM SERPL-MCNC: 8.7 MG/DL (ref 8.5–9.9)
CHLORIDE SERPL-SCNC: 106 MEQ/L (ref 95–107)
CO2 SERPL-SCNC: 23 MEQ/L (ref 20–31)
CREAT SERPL-MCNC: 0.89 MG/DL (ref 0.7–1.2)
EKG ATRIAL RATE: 75 BPM
EKG P AXIS: 61 DEGREES
EKG P-R INTERVAL: 156 MS
EKG Q-T INTERVAL: 402 MS
EKG QRS DURATION: 102 MS
EKG QTC CALCULATION (BAZETT): 448 MS
EKG R AXIS: 41 DEGREES
EKG T AXIS: -19 DEGREES
EKG VENTRICULAR RATE: 75 BPM
EOSINOPHIL # BLD: 0.1 K/UL (ref 0–0.7)
EOSINOPHIL NFR BLD: 1.6 %
GLOBULIN SER CALC-MCNC: 2.4 G/DL (ref 2.3–3.5)
GLUCOSE SERPL-MCNC: 101 MG/DL (ref 70–99)
HCT VFR BLD AUTO: 42.8 % (ref 42–52)
HGB BLD-MCNC: 14.4 G/DL (ref 14–18)
LYMPHOCYTES # BLD: 3.4 K/UL (ref 1–4.8)
LYMPHOCYTES NFR BLD: 42.3 %
MAGNESIUM SERPL-MCNC: 1.9 MG/DL (ref 1.7–2.4)
MCH RBC QN AUTO: 30.4 PG (ref 27–31.3)
MCHC RBC AUTO-ENTMCNC: 33.6 % (ref 33–37)
MCV RBC AUTO: 90.3 FL (ref 79–92.2)
MONOCYTES # BLD: 0.7 K/UL (ref 0.2–0.8)
MONOCYTES NFR BLD: 8.7 %
NEUTROPHILS # BLD: 3.8 K/UL (ref 1.4–6.5)
NEUTS SEG NFR BLD: 46.8 %
PLATELET # BLD AUTO: 245 K/UL (ref 130–400)
POTASSIUM SERPL-SCNC: 3.8 MEQ/L (ref 3.4–4.9)
PROT SERPL-MCNC: 6.7 G/DL (ref 6.3–8)
RBC # BLD AUTO: 4.74 M/UL (ref 4.7–6.1)
SODIUM SERPL-SCNC: 140 MEQ/L (ref 135–144)
TROPONIN, HIGH SENSITIVITY: 10 NG/L (ref 0–19)
WBC # BLD AUTO: 8.1 K/UL (ref 4.8–10.8)

## 2024-02-26 PROCEDURE — 85025 COMPLETE CBC W/AUTO DIFF WBC: CPT

## 2024-02-26 PROCEDURE — 84484 ASSAY OF TROPONIN QUANT: CPT

## 2024-02-26 PROCEDURE — 93005 ELECTROCARDIOGRAM TRACING: CPT | Performed by: EMERGENCY MEDICINE

## 2024-02-26 PROCEDURE — 80053 COMPREHEN METABOLIC PANEL: CPT

## 2024-02-26 PROCEDURE — 83735 ASSAY OF MAGNESIUM: CPT

## 2024-02-26 PROCEDURE — 99285 EMERGENCY DEPT VISIT HI MDM: CPT

## 2024-02-26 PROCEDURE — 71046 X-RAY EXAM CHEST 2 VIEWS: CPT

## 2024-02-26 PROCEDURE — 36415 COLL VENOUS BLD VENIPUNCTURE: CPT

## 2024-02-26 ASSESSMENT — ENCOUNTER SYMPTOMS
PHOTOPHOBIA: 0
SORE THROAT: 0
COUGH: 0
ABDOMINAL PAIN: 0
CHEST TIGHTNESS: 0
EYE DISCHARGE: 0
ABDOMINAL DISTENTION: 0
VOMITING: 0
WHEEZING: 0
SHORTNESS OF BREATH: 0

## 2024-02-26 ASSESSMENT — PAIN DESCRIPTION - LOCATION: LOCATION: CHEST

## 2024-02-26 ASSESSMENT — LIFESTYLE VARIABLES
HOW OFTEN DO YOU HAVE A DRINK CONTAINING ALCOHOL: MONTHLY OR LESS
HOW MANY STANDARD DRINKS CONTAINING ALCOHOL DO YOU HAVE ON A TYPICAL DAY: 1 OR 2

## 2024-02-26 ASSESSMENT — PAIN SCALES - GENERAL: PAINLEVEL_OUTOF10: 8

## 2024-02-26 ASSESSMENT — PAIN - FUNCTIONAL ASSESSMENT: PAIN_FUNCTIONAL_ASSESSMENT: 0-10

## 2024-02-26 NOTE — ED PROVIDER NOTES
[facility] ED  eMERGENCY dEPARTMENT eNCOUnter      Pt Name: Rakesh Wynne  MRN: [id number]  Birthdate 1980  Date of evaluation: 2/26/2024  Provider: Chato Villa MD    CHIEF COMPLAINT       Chief Complaint   Patient presents with    Hypertension     Felt lightheaded/dizzy so checked BP at home and it was 160/110. States he has CHF and a pacemaker/defibrillator. Upon arrival BP is 155/67.          HISTORY OF PRESENT ILLNESS   (Location/Symptom, Timing/Onset,Context/Setting, Quality, Duration, Modifying Factors, Severity)  Note limiting factors.   Rakesh Wynne is a 43 y.o. male who presents to the emergency department for evaluation of feeling like his heart was racing and dizzy at home.  Blood pressure was also slightly elevated.  He has extensive cardiac history including cardiomyopathy from prior drug abuse and a recent EF of 20%.  Implantable defibrillator for runs of V. tach that has not been firing.  No related chest pain.  No abdominal pain nausea or vomiting.  Last seen by cardiology in the past month.  Reports a pretty normal relaxing day and no exertional activity.     He has not had his defibrillator checked for some time.  He is doing okay overall.  He denies any defibrillator shocks.  History of AICD due to runs of nonsustained VT.  Status post echo ejection fraction of 20% with global hypokinesis, no LV apical thrombus.  Patient with history of nonischemic cardiomyopathy.  Cardiac catheterization in December 2022 with no significant CAD.  His medications were adjusted recently and taken off of magnesium as well as Aldactone.  He is on torsemide, not Lasix     HPI    NursingNotes were reviewed.    REVIEW OF SYSTEMS    (2-9 systems for level 4, 10 or more for level 5)     Review of Systems   Constitutional:  Negative for chills and diaphoresis.   HENT:  Negative for congestion, ear pain, mouth sores and sore throat.    Eyes:  Negative for photophobia and discharge.   Respiratory:

## 2024-03-27 ENCOUNTER — HOSPITAL ENCOUNTER (OUTPATIENT)
Dept: CARDIOLOGY | Age: 44
Discharge: HOME | End: 2024-03-27
Payer: COMMERCIAL

## 2024-03-27 PROCEDURE — 93295 DEV INTERROG REMOTE 1/2/MLT: CPT | Performed by: INTERNAL MEDICINE

## 2024-03-27 PROCEDURE — 93296 REM INTERROG EVL PM/IDS: CPT

## 2024-03-28 DIAGNOSIS — Z95.810 AICD (AUTOMATIC CARDIOVERTER/DEFIBRILLATOR) PRESENT: ICD-10-CM

## 2024-05-21 ENCOUNTER — OFFICE VISIT (OUTPATIENT)
Dept: FAMILY MEDICINE CLINIC | Age: 44
End: 2024-05-21
Payer: COMMERCIAL

## 2024-05-21 VITALS
TEMPERATURE: 98.5 F | OXYGEN SATURATION: 96 % | HEIGHT: 70 IN | BODY MASS INDEX: 33.3 KG/M2 | WEIGHT: 232.6 LBS | SYSTOLIC BLOOD PRESSURE: 124 MMHG | HEART RATE: 94 BPM | DIASTOLIC BLOOD PRESSURE: 80 MMHG

## 2024-05-21 DIAGNOSIS — Z13.1 DIABETES MELLITUS SCREENING: ICD-10-CM

## 2024-05-21 DIAGNOSIS — I42.9 CARDIOMYOPATHY, UNSPECIFIED TYPE (HCC): ICD-10-CM

## 2024-05-21 DIAGNOSIS — I50.42 CHRONIC COMBINED SYSTOLIC AND DIASTOLIC HEART FAILURE (HCC): Primary | ICD-10-CM

## 2024-05-21 DIAGNOSIS — J43.2 CENTRILOBULAR EMPHYSEMA (HCC): ICD-10-CM

## 2024-05-21 DIAGNOSIS — Z13.220 LIPID SCREENING: ICD-10-CM

## 2024-05-21 PROBLEM — J96.90 RESPIRATORY FAILURE (HCC): Status: RESOLVED | Noted: 2022-12-31 | Resolved: 2024-05-21

## 2024-05-21 PROCEDURE — 99214 OFFICE O/P EST MOD 30 MIN: CPT | Performed by: NURSE PRACTITIONER

## 2024-05-21 SDOH — ECONOMIC STABILITY: FOOD INSECURITY: WITHIN THE PAST 12 MONTHS, THE FOOD YOU BOUGHT JUST DIDN'T LAST AND YOU DIDN'T HAVE MONEY TO GET MORE.: NEVER TRUE

## 2024-05-21 SDOH — ECONOMIC STABILITY: INCOME INSECURITY: HOW HARD IS IT FOR YOU TO PAY FOR THE VERY BASICS LIKE FOOD, HOUSING, MEDICAL CARE, AND HEATING?: NOT HARD AT ALL

## 2024-05-21 SDOH — ECONOMIC STABILITY: FOOD INSECURITY: WITHIN THE PAST 12 MONTHS, YOU WORRIED THAT YOUR FOOD WOULD RUN OUT BEFORE YOU GOT MONEY TO BUY MORE.: NEVER TRUE

## 2024-05-21 ASSESSMENT — ENCOUNTER SYMPTOMS
COUGH: 0
SHORTNESS OF BREATH: 0

## 2024-05-21 NOTE — PROGRESS NOTES
Subjective  Chief Complaint   Patient presents with    4 MONTH FOLLOW UP    Headache     Pt. States that he is not having headaches/migraines like he was and the medication Zanaflex he did not like the way it made him feel so he discontinued it.       HPI    Breathing/emphysema- has been fairly stable. Ok to do work even in the heat. States that he just takes a break when needed.     Doing well with medications. Was able to get them all. Taking them regularly.     Following with cardio for HF and cardiomyopathy. Numbers have been trending in a positive direction.    Has not been drinking heavily although admits to having some beer on occasion.     Lab Results   Component Value Date    WBC 8.1 02/26/2024    HGB 14.4 02/26/2024    HCT 42.8 02/26/2024     02/26/2024    ALT 21 02/26/2024    AST 16 02/26/2024     02/26/2024    K 3.8 02/26/2024     02/26/2024    CREATININE 0.89 02/26/2024    BUN 15 02/26/2024    CO2 23 02/26/2024    TSH 0.778 08/03/2023    INR 1.0 08/03/2023     HA's have improved since seeing me last.     Patient Active Problem List    Diagnosis Date Noted    Mediastinal lymphadenopathy 01/01/2023    Shortness of breath 01/01/2023    Centrilobular emphysema (HCC) 01/01/2023    Bilateral pleural effusion 01/01/2023    Tobacco abuse 01/01/2023    Heart failure (HCC) 12/31/2022    Chronic systolic (congestive) heart failure 01/11/2024    ETOH abuse 07/13/2023    Cardiomyopathy (HCC) 06/25/2023    VT (ventricular tachycardia) (HCC) 06/25/2023    Lumbar radiculopathy 06/07/2018    Herniated lumbar intervertebral disc 06/07/2018    Dehiscence of operative wound 02/18/2013    Scalp cyst 01/25/2013    Pilonidal cyst with abscess 01/17/2013     Past Medical History:   Diagnosis Date    Centrilobular emphysema (HCC) 1/1/2023    Chronic systolic (congestive) heart failure 1/11/2024    ETOH abuse 7/13/2023    ETOH abuse 7/13/2023    Lumbar radiculopathy 6/7/2018     Past Surgical History:

## 2024-05-22 DIAGNOSIS — Z13.220 LIPID SCREENING: ICD-10-CM

## 2024-05-22 DIAGNOSIS — Z13.1 DIABETES MELLITUS SCREENING: ICD-10-CM

## 2024-05-22 LAB
CHOLEST SERPL-MCNC: 180 MG/DL (ref 0–199)
ESTIMATED AVERAGE GLUCOSE: 103 MG/DL
HBA1C MFR BLD: 5.2 % (ref 4–6)
HDLC SERPL-MCNC: 62 MG/DL (ref 40–59)
LDLC SERPL CALC-MCNC: 102 MG/DL (ref 0–129)
TRIGL SERPL-MCNC: 78 MG/DL (ref 0–150)

## 2024-06-30 ENCOUNTER — HOSPITAL ENCOUNTER (OUTPATIENT)
Dept: CARDIOLOGY | Age: 44
Discharge: HOME | End: 2024-06-30
Payer: COMMERCIAL

## 2024-06-30 PROCEDURE — 93295 DEV INTERROG REMOTE 1/2/MLT: CPT | Performed by: INTERNAL MEDICINE

## 2024-06-30 PROCEDURE — 93296 REM INTERROG EVL PM/IDS: CPT

## 2024-07-08 ENCOUNTER — TELEPHONE (OUTPATIENT)
Dept: CARDIOLOGY | Facility: CLINIC | Age: 44
End: 2024-07-08
Payer: COMMERCIAL

## 2024-07-08 NOTE — TELEPHONE ENCOUNTER
07/08/2024 I have tried to contact patient for an appointment with Dr. Vanegas for months now for an abnormal device check.  Letter has been sent and repeated phone messages have been left. He will not return my calls.  Looking in chart, patient is being followed by [facility].

## 2024-10-07 ENCOUNTER — HOSPITAL ENCOUNTER (OUTPATIENT)
Dept: CARDIOLOGY | Age: 44
Discharge: HOME | End: 2024-10-07
Payer: COMMERCIAL

## 2024-10-07 PROCEDURE — 93296 REM INTERROG EVL PM/IDS: CPT

## 2024-10-14 ENCOUNTER — TELEPHONE (OUTPATIENT)
Dept: CARDIOLOGY | Facility: CLINIC | Age: 44
End: 2024-10-14
Payer: COMMERCIAL

## 2024-10-14 NOTE — TELEPHONE ENCOUNTER
10/14/2024 Tried to call patient to schedule appointment with Dr. Vanegas.  Patient did not answer and voicemail is full.  Patient has not returned phone calls in past.  A letter was also sent to patient.  In looking in the chart, I believe he now follows with [facility] cardiology.

## 2024-10-22 ENCOUNTER — HOSPITAL ENCOUNTER (OUTPATIENT)
Dept: CARDIOLOGY | Age: 44
Discharge: HOME | End: 2024-10-22
Payer: COMMERCIAL

## 2024-10-22 PROCEDURE — 93283 PRGRMG EVAL IMPLANTABLE DFB: CPT

## 2024-12-21 ENCOUNTER — HOSPITAL ENCOUNTER (OUTPATIENT)
Dept: CARDIOLOGY | Age: 44
Discharge: HOME | End: 2024-12-21
Payer: COMMERCIAL

## 2024-12-21 PROCEDURE — 93297 REM INTERROG DEV EVAL ICPMS: CPT

## 2024-12-21 PROCEDURE — 93295 DEV INTERROG REMOTE 1/2/MLT: CPT | Performed by: INTERNAL MEDICINE

## 2025-01-28 ENCOUNTER — HOSPITAL ENCOUNTER (OUTPATIENT)
Dept: CARDIOLOGY | Age: 45
Discharge: HOME | End: 2025-01-28
Payer: COMMERCIAL

## 2025-01-28 PROCEDURE — 93295 DEV INTERROG REMOTE 1/2/MLT: CPT | Performed by: INTERNAL MEDICINE

## 2025-01-28 PROCEDURE — 93296 REM INTERROG EVL PM/IDS: CPT

## 2025-02-11 ENCOUNTER — HOSPITAL ENCOUNTER (EMERGENCY)
Age: 45
Discharge: HOME | End: 2025-02-11
Attending: EMERGENCY MEDICINE
Payer: COMMERCIAL

## 2025-02-11 VITALS
WEIGHT: 230 LBS | HEIGHT: 70 IN | SYSTOLIC BLOOD PRESSURE: 126 MMHG | BODY MASS INDEX: 32.93 KG/M2 | OXYGEN SATURATION: 97 % | RESPIRATION RATE: 17 BRPM | TEMPERATURE: 98.9 F | DIASTOLIC BLOOD PRESSURE: 99 MMHG | HEART RATE: 115 BPM

## 2025-02-11 DIAGNOSIS — J11.1 FLU: Primary | ICD-10-CM

## 2025-02-11 LAB
INFLUENZA A BY PCR: POSITIVE
INFLUENZA B BY PCR: NEGATIVE
SARS-COV-2 RDRP RESP QL NAA+PROBE: NOT DETECTED
STREP GRP A PCR: NEGATIVE

## 2025-02-11 PROCEDURE — 87635 SARS-COV-2 COVID-19 AMP PRB: CPT

## 2025-02-11 PROCEDURE — 87651 STREP A DNA AMP PROBE: CPT

## 2025-02-11 PROCEDURE — 87502 INFLUENZA DNA AMP PROBE: CPT

## 2025-02-11 PROCEDURE — 99283 EMERGENCY DEPT VISIT LOW MDM: CPT

## 2025-02-11 ASSESSMENT — PAIN DESCRIPTION - LOCATION: LOCATION: GENERALIZED

## 2025-02-11 ASSESSMENT — ENCOUNTER SYMPTOMS
SORE THROAT: 1
SINUS PAIN: 0
ABDOMINAL PAIN: 0
DIARRHEA: 0
COLOR CHANGE: 0
NAUSEA: 0
VOMITING: 0
EYE DISCHARGE: 0
EYE REDNESS: 0
SHORTNESS OF BREATH: 0
BACK PAIN: 0
COUGH: 1

## 2025-02-11 ASSESSMENT — PAIN SCALES - GENERAL: PAINLEVEL_OUTOF10: 8

## 2025-02-11 ASSESSMENT — PAIN DESCRIPTION - FREQUENCY: FREQUENCY: CONTINUOUS

## 2025-02-11 ASSESSMENT — LIFESTYLE VARIABLES
HOW MANY STANDARD DRINKS CONTAINING ALCOHOL DO YOU HAVE ON A TYPICAL DAY: 1 OR 2
HOW MANY STANDARD DRINKS CONTAINING ALCOHOL DO YOU HAVE ON A TYPICAL DAY: 5 OR 6
HOW OFTEN DO YOU HAVE A DRINK CONTAINING ALCOHOL: MONTHLY OR LESS
HOW OFTEN DO YOU HAVE A DRINK CONTAINING ALCOHOL: 2-3 TIMES A WEEK

## 2025-02-11 ASSESSMENT — PAIN DESCRIPTION - DESCRIPTORS: DESCRIPTORS: ACHING

## 2025-02-11 ASSESSMENT — PAIN DESCRIPTION - PAIN TYPE: TYPE: ACUTE PAIN

## 2025-02-11 ASSESSMENT — PAIN - FUNCTIONAL ASSESSMENT
PAIN_FUNCTIONAL_ASSESSMENT: 0-10
PAIN_FUNCTIONAL_ASSESSMENT: PREVENTS OR INTERFERES SOME ACTIVE ACTIVITIES AND ADLS

## 2025-02-11 NOTE — ED PROVIDER NOTES
No medications on file     Controlled Substances Monitoring:     RX Monitoring Periodic Controlled Substance Monitoring   2/14/2021   5:13 AM No signs of potential drug abuse or diversion identified.       (Please note that portions of this note were completed with a voice recognition program.  Efforts were made to edit the dictations but occasionally words are mis-transcribed.)    Stephany Smalls DO (electronically signed)  Attending Emergency Physician            Stephany Smalls DO  02/11/25 [id number]

## 2025-02-28 ENCOUNTER — HOSPITAL ENCOUNTER (OUTPATIENT)
Dept: CARDIOLOGY | Age: 45
Discharge: HOME | End: 2025-02-28
Payer: COMMERCIAL

## 2025-02-28 PROCEDURE — 93297 REM INTERROG DEV EVAL ICPMS: CPT | Performed by: INTERNAL MEDICINE

## 2025-02-28 PROCEDURE — 93297 REM INTERROG DEV EVAL ICPMS: CPT

## 2025-04-30 ENCOUNTER — TELEPHONE (OUTPATIENT)
Dept: FAMILY MEDICINE CLINIC | Age: 45
End: 2025-04-30

## 2025-05-01 ENCOUNTER — TELEPHONE (OUTPATIENT)
Dept: CARDIOLOGY | Facility: CLINIC | Age: 45
End: 2025-05-01
Payer: COMMERCIAL

## 2025-05-01 DIAGNOSIS — Z95.810 AICD (AUTOMATIC CARDIOVERTER/DEFIBRILLATOR) PRESENT: ICD-10-CM

## 2025-05-01 NOTE — TELEPHONE ENCOUNTER
Rec'd fax from [facility] device clinic requesting  order for services rendered.  Order entered for date of service rendered and x next year for routine device checks in-clinic and remotely or as directed by physician.

## 2025-05-07 ENCOUNTER — OFFICE VISIT (OUTPATIENT)
Dept: FAMILY MEDICINE CLINIC | Age: 45
End: 2025-05-07

## 2025-05-07 VITALS
TEMPERATURE: 98.4 F | OXYGEN SATURATION: 99 % | DIASTOLIC BLOOD PRESSURE: 82 MMHG | WEIGHT: 236 LBS | HEART RATE: 76 BPM | HEIGHT: 70 IN | BODY MASS INDEX: 33.79 KG/M2 | SYSTOLIC BLOOD PRESSURE: 130 MMHG

## 2025-05-07 DIAGNOSIS — I42.9 CARDIOMYOPATHY, UNSPECIFIED TYPE (HCC): ICD-10-CM

## 2025-05-07 DIAGNOSIS — I50.42 CHRONIC COMBINED SYSTOLIC AND DIASTOLIC HEART FAILURE (HCC): Primary | ICD-10-CM

## 2025-05-07 DIAGNOSIS — I50.42 CHRONIC COMBINED SYSTOLIC AND DIASTOLIC HEART FAILURE (HCC): ICD-10-CM

## 2025-05-07 LAB
ALBUMIN SERPL-MCNC: 4.6 G/DL (ref 3.5–4.6)
ALP SERPL-CCNC: 64 U/L (ref 35–104)
ALT SERPL-CCNC: 29 U/L (ref 0–41)
ANION GAP SERPL CALCULATED.3IONS-SCNC: 10 MEQ/L (ref 9–15)
AST SERPL-CCNC: 24 U/L (ref 0–40)
BILIRUB SERPL-MCNC: 0.4 MG/DL (ref 0.2–0.7)
BUN SERPL-MCNC: 21 MG/DL (ref 6–20)
CALCIUM SERPL-MCNC: 9.7 MG/DL (ref 8.5–9.9)
CHLORIDE SERPL-SCNC: 105 MEQ/L (ref 95–107)
CO2 SERPL-SCNC: 23 MEQ/L (ref 20–31)
CREAT SERPL-MCNC: 1.13 MG/DL (ref 0.7–1.2)
ERYTHROCYTE [DISTWIDTH] IN BLOOD BY AUTOMATED COUNT: 12.6 % (ref 11.5–14.5)
GLOBULIN SER CALC-MCNC: 2.6 G/DL (ref 2.3–3.5)
GLUCOSE SERPL-MCNC: 118 MG/DL (ref 70–99)
HCT VFR BLD AUTO: 44.7 % (ref 42–52)
HGB BLD-MCNC: 15.2 G/DL (ref 14–18)
MCH RBC QN AUTO: 31.9 PG (ref 27–31.3)
MCHC RBC AUTO-ENTMCNC: 34 % (ref 33–37)
MCV RBC AUTO: 93.9 FL (ref 79–92.2)
PLATELET # BLD AUTO: 247 K/UL (ref 130–400)
POTASSIUM SERPL-SCNC: 4 MEQ/L (ref 3.4–4.9)
PROT SERPL-MCNC: 7.2 G/DL (ref 6.3–8)
RBC # BLD AUTO: 4.76 M/UL (ref 4.7–6.1)
SODIUM SERPL-SCNC: 138 MEQ/L (ref 135–144)
WBC # BLD AUTO: 7.7 K/UL (ref 4.8–10.8)

## 2025-05-07 PROCEDURE — 99214 OFFICE O/P EST MOD 30 MIN: CPT | Performed by: NURSE PRACTITIONER

## 2025-05-07 RX ORDER — METOPROLOL SUCCINATE 100 MG/1
50 TABLET, EXTENDED RELEASE ORAL DAILY
COMMUNITY
Start: 2025-04-11 | End: 2025-05-07 | Stop reason: SDUPTHER

## 2025-05-07 RX ORDER — SACUBITRIL AND VALSARTAN 49; 51 MG/1; MG/1
1 TABLET, FILM COATED ORAL 2 TIMES DAILY
Qty: 60 TABLET | Refills: 5 | Status: SHIPPED | OUTPATIENT
Start: 2025-05-07

## 2025-05-07 RX ORDER — METOPROLOL SUCCINATE 100 MG/1
100 TABLET, EXTENDED RELEASE ORAL DAILY
Qty: 30 TABLET | Refills: 5 | Status: SHIPPED | OUTPATIENT
Start: 2025-05-07

## 2025-05-07 RX ORDER — VALACYCLOVIR HYDROCHLORIDE 500 MG/1
500 TABLET, FILM COATED ORAL DAILY
Qty: 30 TABLET | Refills: 5 | Status: SHIPPED | OUTPATIENT
Start: 2025-05-07

## 2025-05-07 SDOH — ECONOMIC STABILITY: FOOD INSECURITY: WITHIN THE PAST 12 MONTHS, YOU WORRIED THAT YOUR FOOD WOULD RUN OUT BEFORE YOU GOT MONEY TO BUY MORE.: NEVER TRUE

## 2025-05-07 SDOH — ECONOMIC STABILITY: FOOD INSECURITY: WITHIN THE PAST 12 MONTHS, THE FOOD YOU BOUGHT JUST DIDN'T LAST AND YOU DIDN'T HAVE MONEY TO GET MORE.: NEVER TRUE

## 2025-05-07 ASSESSMENT — ENCOUNTER SYMPTOMS
CONSTIPATION: 1
DIARRHEA: 0
COUGH: 0
SHORTNESS OF BREATH: 0

## 2025-05-07 ASSESSMENT — PATIENT HEALTH QUESTIONNAIRE - PHQ9
SUM OF ALL RESPONSES TO PHQ QUESTIONS 1-9: 0
SUM OF ALL RESPONSES TO PHQ QUESTIONS 1-9: 0
1. LITTLE INTEREST OR PLEASURE IN DOING THINGS: NOT AT ALL
SUM OF ALL RESPONSES TO PHQ QUESTIONS 1-9: 0
SUM OF ALL RESPONSES TO PHQ QUESTIONS 1-9: 0
2. FEELING DOWN, DEPRESSED OR HOPELESS: NOT AT ALL

## 2025-05-07 NOTE — PROGRESS NOTES
and regular rhythm.      Pulses: Normal pulses.      Heart sounds: Normal heart sounds.   Pulmonary:      Effort: Pulmonary effort is normal.      Breath sounds: Normal breath sounds.   Musculoskeletal:      Cervical back: Normal range of motion and neck supple.   Skin:     General: Skin is warm.   Neurological:      General: No focal deficit present.      Mental Status: He is alert and oriented to person, place, and time. Mental status is at baseline.   Psychiatric:         Mood and Affect: Mood normal.         Behavior: Behavior normal.         Thought Content: Thought content normal.         Judgment: Judgment normal.         Physical Exam  Respiratory: Clear to auscultation, no wheezing, rales or rhonchi  Cardiovascular: Regular rate and rhythm, no murmurs, rubs, or gallops     Assessment & Plan   Diagnosis Orders   1. Chronic combined systolic and diastolic heart failure (HCC)  Comprehensive Metabolic Panel    CBC    empagliflozin (JARDIANCE) 10 MG tablet    ENTRESTO 49-51 MG per tablet    metoprolol succinate (TOPROL XL) 100 MG extended release tablet      2. Cardiomyopathy, unspecified type (HCC)  Comprehensive Metabolic Panel    CBC    empagliflozin (JARDIANCE) 10 MG tablet    ENTRESTO 49-51 MG per tablet    metoprolol succinate (TOPROL XL) 100 MG extended release tablet          Orders Placed This Encounter   Procedures    Comprehensive Metabolic Panel     Standing Status:   Future     Number of Occurrences:   1     Expected Date:   5/7/2025     Expiration Date:   5/7/2026    CBC     Standing Status:   Future     Number of Occurrences:   1     Expected Date:   5/7/2025     Expiration Date:   5/7/2026       Orders Placed This Encounter   Medications    empagliflozin (JARDIANCE) 10 MG tablet     Sig: Take 1 tablet by mouth daily     Dispense:  30 tablet     Refill:  5    ENTRESTO 49-51 MG per tablet     Sig: Take 1 tablet by mouth 2 times daily     Dispense:  60 tablet     Refill:  5    metoprolol succinate

## 2025-05-08 ENCOUNTER — HOSPITAL ENCOUNTER (OUTPATIENT)
Dept: CARDIOLOGY | Age: 45
Discharge: HOME | End: 2025-05-08

## 2025-05-08 ENCOUNTER — RESULTS FOLLOW-UP (OUTPATIENT)
Dept: FAMILY MEDICINE CLINIC | Age: 45
End: 2025-05-08

## 2025-05-08 PROCEDURE — 93297 REM INTERROG DEV EVAL ICPMS: CPT

## 2025-05-08 PROCEDURE — 93296 REM INTERROG EVL PM/IDS: CPT

## 2025-05-08 PROCEDURE — 93295 DEV INTERROG REMOTE 1/2/MLT: CPT | Performed by: INTERNAL MEDICINE

## 2025-05-09 ENCOUNTER — TELEPHONE (OUTPATIENT)
Dept: CARDIOLOGY | Facility: CLINIC | Age: 45
End: 2025-05-09
Payer: COMMERCIAL

## 2025-05-09 NOTE — TELEPHONE ENCOUNTER
Left message for patient to call for scheduling.  Abnormal device check.  Have left numerous voicemails and letters for patient previously.

## 2025-05-15 ENCOUNTER — TELEPHONE (OUTPATIENT)
Dept: CARDIOLOGY | Facility: CLINIC | Age: 45
End: 2025-05-15
Payer: COMMERCIAL

## 2025-05-15 NOTE — TELEPHONE ENCOUNTER
I have left repeated messages to get patient scheduled with Dr. Vanegas.  I sent out another letter today.

## 2025-05-22 DIAGNOSIS — I50.42 CHRONIC COMBINED SYSTOLIC AND DIASTOLIC HEART FAILURE (HCC): ICD-10-CM

## 2025-05-22 DIAGNOSIS — I42.9 CARDIOMYOPATHY, UNSPECIFIED TYPE (HCC): ICD-10-CM

## 2025-05-22 RX ORDER — SACUBITRIL AND VALSARTAN 49; 51 MG/1; MG/1
1 TABLET, FILM COATED ORAL 2 TIMES DAILY
Qty: 60 TABLET | Refills: 5 | Status: SHIPPED | OUTPATIENT
Start: 2025-05-22 | End: 2025-05-23 | Stop reason: SDUPTHER

## 2025-05-22 NOTE — TELEPHONE ENCOUNTER
Pt would like to have written prescriptions for these medications because he is with out insurance and is looking for the best cost effective place to get them.  Please give pt a call when they are done so he can come pick them up.      Comments:      Last Office Visit (last PCP visit):   5/7/2025     Next Visit Date:    Future Appointments   Date Time Provider Department Center   11/10/2025  4:00 PM Wanda Michaels, APRN - CNP [facility]   11/20/2025 10:30 AM [facility]        **If hasn't been seen in over a year OR hasn't followed up according to last diabetes/ADHD visit, make appointment for patient before sending refill to provider.     Rx requested:    Requested Prescriptions     Pending Prescriptions Disp Refills    empagliflozin (JARDIANCE) 10 MG tablet 30 tablet 5     Sig: Take 1 tablet by mouth daily    ENTRESTO 49-51 MG per tablet 60 tablet 5     Sig: Take 1 tablet by mouth 2 times daily

## 2025-05-23 RX ORDER — SACUBITRIL AND VALSARTAN 49; 51 MG/1; MG/1
1 TABLET, FILM COATED ORAL 2 TIMES DAILY
Qty: 60 TABLET | Refills: 5 | Status: SHIPPED | OUTPATIENT
Start: 2025-05-23

## 2025-05-23 NOTE — TELEPHONE ENCOUNTER
These were suppose to be printed can you please order them, tried to call DM but no answer on lunch

## 2025-06-13 ENCOUNTER — HOSPITAL ENCOUNTER (OUTPATIENT)
Dept: CARDIOLOGY | Age: 45
Discharge: HOME | End: 2025-06-13

## 2025-06-13 PROCEDURE — 93297 REM INTERROG DEV EVAL ICPMS: CPT
